# Patient Record
Sex: MALE | Race: WHITE | HISPANIC OR LATINO | Employment: FULL TIME | URBAN - METROPOLITAN AREA
[De-identification: names, ages, dates, MRNs, and addresses within clinical notes are randomized per-mention and may not be internally consistent; named-entity substitution may affect disease eponyms.]

---

## 2018-04-02 ENCOUNTER — OFFICE VISIT (OUTPATIENT)
Dept: BARIATRICS | Facility: CLINIC | Age: 43
End: 2018-04-02

## 2018-04-02 VITALS
HEIGHT: 73 IN | SYSTOLIC BLOOD PRESSURE: 130 MMHG | HEART RATE: 67 BPM | DIASTOLIC BLOOD PRESSURE: 80 MMHG | TEMPERATURE: 98.6 F | WEIGHT: 315 LBS | BODY MASS INDEX: 41.75 KG/M2

## 2018-04-02 DIAGNOSIS — E66.9 OBESITY, CLASS I, BMI 30-34.9: Primary | ICD-10-CM

## 2018-04-02 DIAGNOSIS — E66.01 MORBID (SEVERE) OBESITY DUE TO EXCESS CALORIES (HCC): Primary | ICD-10-CM

## 2018-04-02 PROCEDURE — RECHECK

## 2018-04-02 NOTE — PROGRESS NOTES
Bariatric Nutrition Assessment Note    Type of surgery    Preop  Surgery Date: TBD  Surgeon : Dr Jacob Aviles  43 y o   male     [place] with BMI of 25: 188 6 lb   Pre-Op Excess Wt: 184 9 lb  Blood pressure 130/80, pulse 67, temperature 98 6 °F (37 °C), height 6' 1" (1 854 m), weight (!) 169 kg (373 lb 8 oz)  Body mass index is 49 28 kg/m²  [facility] Equation:  BMR: 2648 kcal per day  2178 kcal for weight loss of 2 # per week  175 grams carb per day   125 grams protein per day       Weight History   Onset of Obesity: Childhood  Family history of obesity: Yes- sister had issues with weight, taking medication for weight loss, followed by doctor,  Wt Loss Attempts: High Protein/Low CHO diets (Atkins, Union, etc )  phenteramine for weight loss, LA weight loss  Maximum Wt Lost: 15 pounds lost , kept off several months  Review of History and Medications   History reviewed  No pertinent past medical history  History reviewed  No pertinent surgical history  Social History     Social History    Marital status: /Civil Union     Spouse name: N/A    Number of children: N/A    Years of education: N/A     Social History Main Topics    Smoking status: Never Smoker    Smokeless tobacco: Never Used    Alcohol use No    Drug use: No    Sexual activity: Not Asked     Other Topics Concern    None     Social History Narrative    None     No current outpatient prescriptions on file  Food Intake and Lifestyle Assessment   Food Intake Assessment completed via food log brought by patient  Breakfast: eggs with toast, normally around 7 am   Snack: 0   Lunch: 4 ribs with white rice, salad and fried plantains  Snack: 0  Dinner: burger with fries  Snack: crackers or mixed nuts , white cheddar popcorn  Beverage intake: water, juice, coffee/tea and rarely drinks soda      Protein supplement: none currently   Estimated protein intake per day: 80-90 grams   Estimated fluid intake per day: 64 ounces or more per day   Meals eaten away from home: 2 per week   Typical meal pattern: 3 meals per day and 0-1 snacks per day  Eating Behaviors: Consumption of high calorie/ high fat foods, Large portion sizes, Craves sweet foods and stress eating   Food allergies or intolerances: No Known Allergies  Cultural or Roman Catholic considerations: none noted     Physical Assessment  Physical Activity  Types of exercise: None, plans to buy a fit bit   Current physical limitations: none noted, short on breath     Psychosocial Assessment   Support systems: spouse sibling(s) friend(s)   Socioeconomic factors: none noted     Nutrition Diagnosis  Diagnosis: Overweight / Obesity (NC-3 3)  Related to: Physical inactivity and Excessive energy intake  As Evidenced by: BMI>35     Nutrition Prescription: Recommend the following diet  Regular and high protein     Interventions and Teaching   Discussed pre-op and post-op nutrition guidelines  Patient educated and handouts provided    Surgical changes to stomach / GI  Capacity of post-surgery stomach  Diet progression  Adequate hydration  Sugar and fat restriction to decrease "dumping syndrome"  Exercise  Suggestions for pre-op diet  Nutrition considerations after surgery  Protein supplements  Appropriate carbohydrate, protein, and fat intake, and food/fluid choices to maximize safe weight loss, nutrient intake, and tolerance   Dietary and lifestyle changes  Possible problems with poor eating habits  Intuitive eating  Techniques for self monitoring and keeping daily food journal  Vitamin / Mineral supplementation of Multivitamin with minerals, Vitamin D and patient is currently not taking any vitamins or  Minerals, instructed to start a multivitamin and mineral plus vitamin D3    Education provided to: patient    Barriers to learning: No barriers identified    Readiness to change: preparation    Prior research on procedure: discussed with provider, pre-op class and friends or family  had surgery and he has spoken with him concerning his experience with the gastric sleeve  Comprehension: demonstrated understanding     Expected Compliance: good  Recommendations  Pt is an appropriate candidate for surgery   Yes  Evaluation / Monitoring  Dietitian to Monitor: Eating pattern as discussed Tolerance of nutrition prescription Body weight Lab values Physical activity    Goals  Food journal, Complete lession plans 1-6, Eat 3 meals per day, Eliminate mindless snacking and purchase fit bit and track stesp- 6000 to 10 000 steps per day     Time Spent:   1 Hour

## 2018-04-02 NOTE — PROGRESS NOTES
Bariatric Behavioral Health Evaluation    Presenting Problem:  Soniya Police, :  1975    Patient here to increase health, increase mobility, prevent family diseases    Is the patient seeking Bariatric Surgery Eval? Yes  If yes how long have you researched this surgery option  Patient has been researching bariatric surgery for approximately 4 years    Realizes Post- Op Requirements? Yes   Patient/s spouse is seeking bariatric surgery at the same time at this place    Pre-morbid level of function and history of present illness: Patient would like to prevent the increase in health conditions    Psychiatric/Psychological Treatment Diagnosis:     Outpatient Counselor No     Psychiatrist No     Have you had Inpatient Treatment?  No    Domestic Violence No     Patient denies history of childhood trauma    Additional comments/stressors related to family/relationships/peer support:  Health, weight, current new business    Physical/Psychological Assessment:     Appearance: appropriate  Sociability: average  Affect: appropriate  Mood: calm  Thought Process: coherent  Speech: normal  Content: no impairment  Orientation: person  Yes , place  Yes , time  Yes , normal attention span  Yes , normal memory  Yes   and normal judgement  Yes   Insight: emotional  good    Risk Assessment:     none    Recommendations: Recommended for surgery  yes    Risk of Harm to Self or Others:     Observation:     Interviews this interview only    Access to weapons no     Based on the previous information, the client presents the following risk of harm to self or others: low      BARIATRIC SURGERY EDUCATION CHECKLIST    I have received education related to my bariatric surgery process and understand:    Patients may be required to complete a psychiatric evaluation and receive clearance for surgery from their psychiatrist     Patients who undergo weight loss surgery are at higher risk of increased mental health concerns and suicide attempts  Patients may be required to complete a full substance abuse evaluation and then complete all treatment recommendations prior to surgery  If diagnosis of abuse/dependence results, patient may be required to remain sober for one (1) year before having bariatric surgery  Patients on psychiatric medications should check with their provider to discuss psychiatric medications and the changes in absorption  Patient should discuss all time release medications with provider and take all medications as prescribed  The recommendation is that there is no use of  any tobacco products, Hookah or  vapes for the bariatric post-operation patient  Bariatric surgery patients should not consume alcohol as a post-operative patient as it may increase risk of numerous health conditions including but not limited to alcohol abuse and ulcers  There is a possibility of weight regain if patient does not follow all program guidelines and recommendations  Bariatric surgery patients should exercise thirty (30) to sixty (60) minutes per day to maintain post-surgical weight loss  Research indicates that bariatric patients are more successful when they see a therapist for up to two (2) years post-op  Patients will follow all medical and dietary recommendations provided  Patient will keep all scheduled appointments and follow up with their physician for a minimum of five (5) years  Patient will take all vitamins as recommended  Post-operative vitamins are life-long  Patient reviewed Bariatric Surgery Education Checklist and agrees they have received education on these issues   Note  Patient denies Axis I diagnosis or treatment  Patient educated regarding the impact of nicotine and alcohol on the post bariatric surgery patient  Patient meets criteria for surgery at this program and is referred to physician

## 2018-04-05 ENCOUNTER — OFFICE VISIT (OUTPATIENT)
Dept: BARIATRICS | Facility: CLINIC | Age: 43
End: 2018-04-05
Payer: COMMERCIAL

## 2018-04-05 VITALS
HEIGHT: 73 IN | WEIGHT: 315 LBS | BODY MASS INDEX: 41.75 KG/M2 | SYSTOLIC BLOOD PRESSURE: 130 MMHG | TEMPERATURE: 98.6 F | DIASTOLIC BLOOD PRESSURE: 90 MMHG | HEART RATE: 69 BPM

## 2018-04-05 DIAGNOSIS — E66.01 MORBID (SEVERE) OBESITY DUE TO EXCESS CALORIES (HCC): Primary | ICD-10-CM

## 2018-04-05 PROCEDURE — 99204 OFFICE O/P NEW MOD 45 MIN: CPT | Performed by: SURGERY

## 2018-04-05 NOTE — PROGRESS NOTES
BARIATRIC INITIAL CONSULT - BARIATRIC SURGERY    Alyssa Soto 43 y o  male MRN: [id number]  Unit/Bed#:  Encounter: [id number]      HPI:  Alyssa Soto is a 43 y o  male who presents with a longstanding history of morbid obesity and inability to sustain a meaningful weight loss  Here today to discuss bariatric options  Visit type: initial visit    Symptoms: inability to loss weight    Associated Symptoms: anxiety    Associated Conditions: abdominal obesity  Disease Complications: none  Weight Loss Interest: high  Previous Diet Trials: low calorie     Exercise Frequency:sedentary  Types of Exercise: walking    Review of Systems   All other systems reviewed and are negative  Historical Information   History reviewed  No pertinent past medical history  History reviewed  No pertinent surgical history  Social History   History   Alcohol Use No     History   Drug Use No     History   Smoking Status    Never Smoker   Smokeless Tobacco    Never Used     Family History: non-contributory    Meds/Allergies   all medications and allergies reviewed  No Known Allergies    Objective     Current Vitals:   Blood Pressure: 130/90 (04/05/18 1531)  Pulse: 69 (04/05/18 1531)  Temperature: 98 6 °F (37 °C) (04/05/18 1531)  Height: 6' 1" (185 4 cm) (04/05/18 1531)  Weight - Scale: (!) 168 kg (371 lb) (04/05/18 1531)    [unfilled]    Invasive Devices          No matching active lines, drains, or airways          Physical Exam   Constitutional: He is oriented to person, place, and time  He appears well-developed and well-nourished  No distress  Neurological: He is alert and oriented to person, place, and time  Psychiatric: He has a normal mood and affect  His behavior is normal  Judgment and thought content normal        Lab Results: I have personally reviewed pertinent lab results  Imaging: I have personally reviewed pertinent reports  EKG, Pathology, and Other Studies: I have personally reviewed pertinent reports  Assessment/PLAN:    43 y o  yo male with a long standing h/o of obesity and inability to sustain any meaningful weight loss on his own despite several attempts  He is interested in the Laparoscopic gastric Bypass or sleeve gastrectomy  As a part of his pre op evaluation, he will be referred to a cardiologist and for a sleep evaluation and consult  He needs an EGD to evaluate the anatomy of his GI tract  I have spent over 45 minutes with him face to face in the office today discussing his options and details of the surgery  We have seen an animation of the surgery on the computer that illustrates how the operation is done and how the anatomy will be altered with the procedure  Over 50% of this was coordinating care  I have discussed and educated the patient with regards to the components of our multidisciplinary program and the importance of compliance and follow up in the post operative period  He was given the opportunity to ask questions and I have answered all of them  The patient was also instructed with regards to the importance of behavior modification, nutritional counseling, support meeting attendance and lifestyle changes that are important to ensure success  Although there is a great statistical chance of improvement or even resolution of most of his associated comorbidities, the results vary from patient to patient and they largely depend on his commitment and compliance  He needs to lose 18 lbs prior to the operation        Zora Horta MD  4/5/2018  3:38 PM

## 2018-04-05 NOTE — LETTER
April 5, 2018     Karissa Deal MD  [address] [address] [place]     Patient: Celena Favre   YOB: 1975   Date of Visit: 4/5/2018       Dear Dr Gabi Hernandez: Thank you for referring Celena Favre to me for evaluation  Below are my notes for this consultation  If you have questions, please do not hesitate to call me  I look forward to following your patient along with you  Sincerely,        Doris Galo MD        CC: No Recipients  Doris Galo MD  4/5/2018  4:09 PM  Sign at close encounter      BARIATRIC INITIAL CONSULT - BARIATRIC SURGERY    Celena Favre 43 y o  male MRN: [id number]  Unit/Bed#:  Encounter: [id number]      HPI:  Celena Favre is a 43 y o  male who presents with a longstanding history of morbid obesity and inability to sustain a meaningful weight loss  Here today to discuss bariatric options  Visit type: initial visit    Symptoms: inability to loss weight    Associated Symptoms: anxiety    Associated Conditions: abdominal obesity  Disease Complications: none  Weight Loss Interest: high  Previous Diet Trials: low calorie     Exercise Frequency:sedentary  Types of Exercise: walking    Review of Systems   All other systems reviewed and are negative  Historical Information   History reviewed  No pertinent past medical history  History reviewed  No pertinent surgical history    Social History   History   Alcohol Use No     History   Drug Use No     History   Smoking Status    Never Smoker   Smokeless Tobacco    Never Used     Family History: non-contributory    Meds/Allergies   all medications and allergies reviewed  No Known Allergies    Objective     Current Vitals:   Blood Pressure: 130/90 (04/05/18 1531)  Pulse: 69 (04/05/18 1531)  Temperature: 98 6 °F (37 °C) (04/05/18 1531)  Height: 6' 1" (185 4 cm) (04/05/18 1531)  Weight - Scale: (!) 168 kg (371 lb) (04/05/18 1531)    [unfilled]    Invasive Devices          No matching active lines, drains, or airways Physical Exam   Constitutional: He is oriented to person, place, and time  He appears well-developed and well-nourished  No distress  Neurological: He is alert and oriented to person, place, and time  Psychiatric: He has a normal mood and affect  His behavior is normal  Judgment and thought content normal        Lab Results: I have personally reviewed pertinent lab results  Imaging: I have personally reviewed pertinent reports  EKG, Pathology, and Other Studies: I have personally reviewed pertinent reports  Assessment/PLAN:    43 y o  yo male with a long standing h/o of obesity and inability to sustain any meaningful weight loss on his own despite several attempts  He is interested in the Laparoscopic gastric Bypass or sleeve gastrectomy  As a part of his pre op evaluation, he will be referred to a cardiologist and for a sleep evaluation and consult  He needs an EGD to evaluate the anatomy of his GI tract  I have spent over 45 minutes with him face to face in the office today discussing his options and details of the surgery  We have seen an animation of the surgery on the computer that illustrates how the operation is done and how the anatomy will be altered with the procedure  Over 50% of this was coordinating care  I have discussed and educated the patient with regards to the components of our multidisciplinary program and the importance of compliance and follow up in the post operative period  He was given the opportunity to ask questions and I have answered all of them  The patient was also instructed with regards to the importance of behavior modification, nutritional counseling, support meeting attendance and lifestyle changes that are important to ensure success      Although there is a great statistical chance of improvement or even resolution of most of his associated comorbidities, the results vary from patient to patient and they largely depend on his commitment and compliance  He needs to lose 18 lbs prior to the operation        Amy Dorado MD  4/5/2018  3:38 PM

## 2018-04-24 PROBLEM — E66.01 MORBID OBESITY (HCC): Status: ACTIVE | Noted: 2018-04-24

## 2018-05-07 PROBLEM — Z91.89 AT RISK FOR OBSTRUCTIVE SLEEP APNEA: Status: ACTIVE | Noted: 2018-05-07

## 2018-05-15 ENCOUNTER — ANESTHESIA EVENT (OUTPATIENT)
Dept: GASTROENTEROLOGY | Facility: HOSPITAL | Age: 43
End: 2018-05-15
Payer: COMMERCIAL

## 2018-05-16 ENCOUNTER — HOSPITAL ENCOUNTER (OUTPATIENT)
Facility: HOSPITAL | Age: 43
Setting detail: OUTPATIENT SURGERY
Discharge: HOME | End: 2018-05-16
Attending: SURGERY | Admitting: SURGERY
Payer: COMMERCIAL

## 2018-05-16 ENCOUNTER — ANESTHESIA (OUTPATIENT)
Dept: GASTROENTEROLOGY | Facility: HOSPITAL | Age: 43
End: 2018-05-16
Payer: COMMERCIAL

## 2018-05-16 VITALS
DIASTOLIC BLOOD PRESSURE: 72 MMHG | TEMPERATURE: 96.7 F | SYSTOLIC BLOOD PRESSURE: 128 MMHG | RESPIRATION RATE: 18 BRPM | BODY MASS INDEX: 41.75 KG/M2 | WEIGHT: 315 LBS | HEART RATE: 72 BPM | HEIGHT: 73 IN | OXYGEN SATURATION: 95 %

## 2018-05-16 DIAGNOSIS — E66.01 MORBID OBESITY (HCC): ICD-10-CM

## 2018-05-16 PROCEDURE — 88305 TISSUE EXAM BY PATHOLOGIST: CPT | Performed by: PATHOLOGY

## 2018-05-16 PROCEDURE — 88342 IMHCHEM/IMCYTCHM 1ST ANTB: CPT | Performed by: PATHOLOGY

## 2018-05-16 PROCEDURE — 43239 EGD BIOPSY SINGLE/MULTIPLE: CPT | Performed by: SURGERY

## 2018-05-16 RX ORDER — PROPOFOL 10 MG/ML
INJECTION, EMULSION INTRAVENOUS AS NEEDED
Status: DISCONTINUED | OUTPATIENT
Start: 2018-05-16 | End: 2018-05-16 | Stop reason: SURG

## 2018-05-16 RX ORDER — SODIUM CHLORIDE 9 MG/ML
125 INJECTION, SOLUTION INTRAVENOUS CONTINUOUS
Status: DISCONTINUED | OUTPATIENT
Start: 2018-05-16 | End: 2018-05-16 | Stop reason: HOSPADM

## 2018-05-16 RX ADMIN — PROPOFOL 200 MG: 10 INJECTION, EMULSION INTRAVENOUS at 10:46

## 2018-05-16 RX ADMIN — PROPOFOL 30 MG: 10 INJECTION, EMULSION INTRAVENOUS at 10:48

## 2018-05-16 RX ADMIN — LIDOCAINE HYDROCHLORIDE 100 MG: 20 INJECTION, SOLUTION INTRAVENOUS at 10:46

## 2018-05-16 RX ADMIN — PROPOFOL 30 MG: 10 INJECTION, EMULSION INTRAVENOUS at 10:47

## 2018-05-16 RX ADMIN — PROPOFOL 20 MG: 10 INJECTION, EMULSION INTRAVENOUS at 10:49

## 2018-05-16 RX ADMIN — SODIUM CHLORIDE 125 ML/HR: 0.9 INJECTION, SOLUTION INTRAVENOUS at 09:05

## 2018-05-16 NOTE — H&P
This is a 43 y o  male with a history of morbid obesity and Body mass index is 48 95 kg/m²  Here for an EGD to evaluate the anatomy of the GI tract  Physical Exam    AAOx3  RRR  CTA B  Abdomen obese  Benign  A/P:    This is a 43 y o  male with a history of morbid obesity and Body mass index is 48 95 kg/m²       Will proceed with the EGD and biopsies        Deepali Lee MD  05/16/18  10:38 AM

## 2018-05-16 NOTE — ANESTHESIA PREPROCEDURE EVALUATION
Review of Systems/Medical History  Patient summary reviewed        Cardiovascular   Pulmonary  Negative pulmonary ROS        GI/Hepatic  Negative GI/hepatic ROS          Negative  ROS        Endo/Other    Obesity  morbid obesity   GYN  Negative gynecology ROS          Hematology  Negative hematology ROS      Musculoskeletal  Negative musculoskeletal ROS        Neurology  Negative neurology ROS      Psychology   Anxiety,              Physical Exam    Airway    Mallampati score: III  TM Distance: >3 FB  Neck ROM: full     Dental   No notable dental hx     Cardiovascular  Rhythm: regular, Rate: normal, Cardiovascular exam normal    Pulmonary  Breath sounds clear to auscultation,     Other Findings        Anesthesia Plan  ASA Score- 3     Anesthesia Type- IV sedation with anesthesia with ASA Monitors  Additional Monitors:   Airway Plan:         Plan Factors-    Induction-     Postoperative Plan-     Informed Consent- Anesthetic plan and risks discussed with patient

## 2018-06-26 ENCOUNTER — OFFICE VISIT (OUTPATIENT)
Dept: SLEEP CENTER | Facility: CLINIC | Age: 43
End: 2018-06-26
Payer: COMMERCIAL

## 2018-06-26 VITALS
HEIGHT: 73 IN | WEIGHT: 315 LBS | DIASTOLIC BLOOD PRESSURE: 78 MMHG | BODY MASS INDEX: 41.75 KG/M2 | SYSTOLIC BLOOD PRESSURE: 124 MMHG | HEART RATE: 80 BPM

## 2018-06-26 DIAGNOSIS — F41.9 ANXIETY: ICD-10-CM

## 2018-06-26 DIAGNOSIS — Z91.89 AT RISK FOR OBSTRUCTIVE SLEEP APNEA: Primary | ICD-10-CM

## 2018-06-26 DIAGNOSIS — E66.01 MORBID (SEVERE) OBESITY DUE TO EXCESS CALORIES (HCC): ICD-10-CM

## 2018-06-26 DIAGNOSIS — R03.0 PREHYPERTENSION: ICD-10-CM

## 2018-06-26 PROCEDURE — 99204 OFFICE O/P NEW MOD 45 MIN: CPT | Performed by: NURSE PRACTITIONER

## 2018-06-26 NOTE — PROGRESS NOTES
Consultation - [address] [facility], 1975, MRN: [id number]    6/26/2018        Reason for Consult / Principal Problem:    Evaluation for possible sleep apnea     Subjective:     HPI: Minor Gunter is a 43y o  year old male  Patient presents for evaluation of possible sleep apnea  He complains of daytime sleepiness with dozing when sedentary in the afternoon  He also reports feeling drowsy when driving at night  He awakens with morning headaches, that resolve throughout the morning without treatment  He also notes some difficulty with concentration  These symptoms have worsened over the past few years as he has gained weight  He is currently working with Dr Javier Olmstead and the bariatrics team and plans to have gastric bypass surgery in the near future  His comorbid conditions include morbid obesity, prehypertension and some mild anxiety with increased stress, for which he does not currently take medication  Review of Systems      Genitourinary none   Cardiology none   Gastrointestinal none   Neurology None, some awakening with headaches, decreased concentration   Constitutional none   Integumentary none   Psychiatry none   Musculoskeletal none   Pulmonary snoring   ENT none   Endocrine none   Hematological none     Employment:  He is currently working part-time as a  and  between the hours of 10:00 a m  to 2:00 p m  and 5:00 p m  to 9:00 p m , 5-6 days per week    Sleep Schedule:       Bedtime:  1:00 a m  to work days and 11:00 p m  on weekends      Latency:  Approximately 15 min      Wakeup time:  6:30am -7:00 a m  Awakenings:       Frequency:  Occasional nighttime awakening      Causes:  Feeling short of breath with some gasping      Duration:  He is able to return to sleep easily within minutes    Daytime Sleepiness / Inappropriate Sleep:       Most severe: Between 2:00 p m  to 3:00 p m         Naps :  Occasional naps 1 time per week      Time: Afternoons      Duration:  2 - 2 1/2 hours       Inappropriate drowsiness / sleep:  He does become drowsy at times in the afternoons and sometimes with nighttime driving    Snoring:  Snores loudly, gasping    Apnea:  His wife has witnessed periods of apnea    Change in Weight:  He has had a slight weight gain of approximately 5 lb over the past 6 months, currently in the bariatrics program with the intention of having gastric bypass surgery    Restless Leg Syndrome:  He denies any clinical symptoms consistent with this diagnosis     Other Complaints:  Denies any sleep walking, sleep talking, bruxism, sleep paralysis or hallucinations surrounding sleep     Social History:      Caffeine:  Approximately 16 oz of regular coffee per day and 12 oz of caffeinated soda      Tobacco:   reports that he has never smoked  He has never used smokeless tobacco        Alcohol:   reports that he drinks alcohol  One glass of wine once per week     Drugs:   reports that he does not use drugs  The review of systems and following portions of the patient's history were reviewed and updated as appropriate: allergies, current medications, past family history, past medical history, past social history, past surgical history and problem list       Objective:       Vitals:    06/26/18 1300   BP: 124/78   Pulse: 80   Weight: (!) 172 kg (378 lb 12 8 oz)   Height: 6' 1" (1 854 m)     Body mass index is 49 98 kg/m²  Neck Circumference: 42 5 (cm)  [place] Sleepiness Scale:   9  No current outpatient prescriptions on file      Physical Exam  General Appearance:   Alert, cooperative, no distress, appears stated age, morbidly obese     Head:   Normocephalic, without obvious abnormality, atraumatic     Eyes:   PERRL, conjunctiva/corneas clear, EOM's intact          Nose:  Nares normal, septum midline, mucosa normal, no drainage or sinus tenderness           Throat:  Lips, teeth and gums normal; tongue normal size and  shape and midline in position; mucosa moist and redundant bilaterally, uvula thick and partially visible, dipping below the base of the tongue, tonsils not visualized, Mallampati class 4       Neck:  Supple, symmetrical, trachea midline, no adenopathy; Thyroid: No enlargement, tenderness or nodules; no carotid bruit or JVD     Lungs:      Clear to auscultation bilaterally, respirations unlabored     Heart:   Regular rate and rhythm, S1 and S2 normal, no murmur, rub or gallop       Extremities:  Extremities normal, atraumatic, no cyanosis or edema     Pulses:  2+ and symmetric all extremities     Skin:  Skin color, texture, turgor normal, no rashes or lesions       Neurologic:  CNII-XII intact  Normal strength, sensation throughout     Sleep Study Results:  No prior sleep studies      ASSESSMENT / PLAN     1  At risk for obstructive sleep apnea     2  Morbid (severe) obesity due to excess calories [facility])  Ambulatory referral to Sleep Medicine   3  Prehypertension     4  Anxiety           Counseling / Coordination of Care  Total clinic time spent today 45 minutes  Greater than 50% of total time was spent with the patient and / or family counseling and / or coordination of care  A description of the counseling / coordination of care:     instructions for management, risk factor reductions, prognosis, patient and family education, impressions and risks and benefits of treatment options    Today we discussed the anatomy and physiology of the upper airway  I pointed out how changes in this region can result in both snoring and abnormal breathing events including apneas and hypopneas  I explained the most common co-morbidities of untreated sleep apnea  After this we talked about some forms of treatment including application of positive airway pressure, mandibular advancement devices and surgery       In order to evaluate the possibility of Obstructive Sleep Apnea as a cause of the patient's symptoms, a test will be completed to identify the presence or absence of abnormal nocturnal breathing  This will consist of either a diagnostic polysomnogram or a home sleep test   If significant abnormal nocturnal breathing is detected, nasal CPAP will be titrated to find the optimum pressure needed to maintain upper airway patency during sleep  Following testing, the patient will return to the [address] for a review of the test results and to discuss possible treatment options  We discussed that weight loss can improve symptoms of sleep apnea  He was encouraged to continue to work with the bariatrics team to help with this  The following instructions have been given to the patient today:    Patient Instructions   1  Schedule diagnostic sleep study with CPAP titration study to follow, if needed  2  Schedule follow up visit to review study results  3  Schedule DME appointment for CPAP equipment, if needed  4   Schedule follow up visit for CPAP compliance and recheck            Artemio Santos, [address] [facility]

## 2018-06-26 NOTE — PATIENT INSTRUCTIONS
1  Schedule diagnostic sleep study with CPAP titration study to follow, if needed  2  Schedule follow up visit to review study results  3  Schedule DME appointment for CPAP equipment, if needed  4   Schedule follow up visit for CPAP compliance and recheck

## 2018-07-01 ENCOUNTER — HOSPITAL ENCOUNTER (OUTPATIENT)
Dept: SLEEP CENTER | Facility: CLINIC | Age: 43
Discharge: HOME | End: 2018-07-01
Payer: COMMERCIAL

## 2018-07-01 DIAGNOSIS — Z91.89 AT RISK FOR OBSTRUCTIVE SLEEP APNEA: ICD-10-CM

## 2018-07-01 PROCEDURE — 95810 POLYSOM 6/> YRS 4/> PARAM: CPT | Performed by: PSYCHIATRY & NEUROLOGY

## 2018-07-01 PROCEDURE — 95810 POLYSOM 6/> YRS 4/> PARAM: CPT

## 2018-07-02 ENCOUNTER — TELEPHONE (OUTPATIENT)
Dept: SLEEP CENTER | Facility: CLINIC | Age: 43
End: 2018-07-02

## 2018-07-02 NOTE — PROGRESS NOTES
Sleep Study Documentation    Pre-Sleep Study       Sleep testing procedure explained to patient:YES    Patient napped prior to study:NO    Caffeine:Dayshift worker after 12PM   Caffeine use:YES- coffee  6 ounces    Alcohol:Dayshift workers after 5PM: Alcohol use:NO    Typical day for patient:YES       Study Documentation  Diagnostic   Snore: Moderate  Supplemental O2: no    O2 flow rate (L/min) range 0  O2 flow rate (L/min) final 0  Minimum SaO2 68  Baseline SaO2 94            EKG abnormalities: No    EEG abnormalities: yes:  EPOCH example and comments:  ECG artifact throughout study  Study Terminated:no    Patient classification: employed       Post-Sleep Study    Medication used at bedtime or during sleep study:NO    Patient reports time it took to fall asleep:less than 20 minutes    Patient reports waking up during study:1 to 2 times  Patient reports returning to sleep without difficulty  Patient reports sleeping 6 to 8 hours without dreaming  Patient reports sleep during study:better than usual    Patient rated sleepiness: Not sleepy or tired    PAP treatment:no

## 2018-07-02 NOTE — TELEPHONE ENCOUNTER
LOTTIE Eisenberg asked me to call the patient and move up his CPAP study due to results of Diagnostic Study  I called pt, told him that his diagnostic study did show Sleep Apnea and moved his appoint,ent to 7/12  Moved up his follow up also

## 2018-07-12 ENCOUNTER — HOSPITAL ENCOUNTER (OUTPATIENT)
Dept: SLEEP CENTER | Facility: CLINIC | Age: 43
Discharge: HOME | End: 2018-07-12
Payer: COMMERCIAL

## 2018-07-12 DIAGNOSIS — Z91.89 AT RISK FOR OBSTRUCTIVE SLEEP APNEA: ICD-10-CM

## 2018-07-12 PROCEDURE — 95811 POLYSOM 6/>YRS CPAP 4/> PARM: CPT | Performed by: PSYCHIATRY & NEUROLOGY

## 2018-07-12 PROCEDURE — 95811 POLYSOM 6/>YRS CPAP 4/> PARM: CPT

## 2018-07-13 DIAGNOSIS — G47.33 OSA (OBSTRUCTIVE SLEEP APNEA): Primary | ICD-10-CM

## 2018-07-13 NOTE — PROGRESS NOTES
Sleep Study Documentation    Pre-Sleep Study       Sleep testing procedure explained to patient:YES    Patient napped prior to study:NO    Caffeine:Dayshift worker after 12PM   Caffeine use:NO    Alcohol:Dayshift workers after 5PM: Alcohol use:YES-Wine 1 serving    Typical day for patient:NO       Study Documentation  Treatment   Optimal PAP pressure: 8  Leak:Small  Snore:Eliminated  REM Obtained:yes  Supplemental O2: no    Minimum SaO2 88  Baseline SaO2 94  PAP mask tried (list all)  Tiki Briggs Large nasal mask  PAP mask choice (final)  same  PAP mask type:nasal  PAP pressure at which snoring was eliminated 8  Minimum SaO2 at final PAP pressure 92  Mode of Therapy:CPAP  ETCO2:No  CPAP changed to BiPAP:No    Mode of Therapy:CPAP    EKG abnormalities: no     EEG abnormalities: no    Study Terminated:no           Post-Sleep Study    Medication used at bedtime or during sleep study:NO    Patient reports time it took to fall asleep:less than 20 minutes    Patient reports waking up during study:1 to 2 times  Patient reports returning to sleep without difficulty  Patient reports sleeping 6 to 8 hours without dreaming  Patient reports sleep during study:better than usual    Patient rated sleepiness: Not sleepy or tired    PAP treatment:yes: Post PAP treatment patient reports feeling better and  would wear PAP mask at home

## 2018-07-17 ENCOUNTER — TELEPHONE (OUTPATIENT)
Dept: SLEEP CENTER | Facility: CLINIC | Age: 43
End: 2018-07-17

## 2018-07-17 NOTE — TELEPHONE ENCOUNTER
Spoke with patient  Instructed to bring mask to follow up appointment on 7/20    Rx and data to [facility]

## 2018-07-20 ENCOUNTER — OFFICE VISIT (OUTPATIENT)
Dept: SLEEP CENTER | Facility: CLINIC | Age: 43
End: 2018-07-20

## 2018-07-20 VITALS
WEIGHT: 315 LBS | HEART RATE: 72 BPM | SYSTOLIC BLOOD PRESSURE: 130 MMHG | DIASTOLIC BLOOD PRESSURE: 90 MMHG | BODY MASS INDEX: 41.75 KG/M2 | HEIGHT: 73 IN

## 2018-07-20 DIAGNOSIS — Z99.89 OBSTRUCTIVE SLEEP APNEA ON CPAP: Primary | ICD-10-CM

## 2018-07-20 DIAGNOSIS — G47.33 OBSTRUCTIVE SLEEP APNEA ON CPAP: Primary | ICD-10-CM

## 2018-07-20 DIAGNOSIS — E66.01 MORBID OBESITY (HCC): ICD-10-CM

## 2018-07-20 PROCEDURE — 99213 OFFICE O/P EST LOW 20 MIN: CPT | Performed by: NURSE PRACTITIONER

## 2018-07-20 NOTE — PATIENT INSTRUCTIONS
1    your CPAP equipment today  2  Begin using your CPAP equipment every night  3  Begin cleaning your CPAP daily after use and once weekly with 1 part white vinegar and 10 parts water  4  Contact your medical equipment distributor regarding any questions concerning your CPAP equipment  5  Contact the [address] with any other questions, prior to next visit, if needed  6    Schedule compliance follow-up visit in 2-3 months

## 2018-07-20 NOTE — PROGRESS NOTES
Progress Note - [facility] 37 y o  male   :1975, MRN: [id number]  2018          Follow Up Evaluation / Problem:     Obstructive Sleep Apnea  Morbid Obesity    HPI: Minor Gunter is a 37y o  year old male  He presents today for follow-up of obstructive sleep apnea  He complained of loud snoring and difficulty sleeping on his back due to trouble breathing  He completed a diagnostic sleep study which indicated an AHI of 28 8 which increased to 80 7 during REM sleep  He completed a CPAP titration study on   He states that he felt much more rested upon awakening the morning of his CPAP study  He is here today to review the results of his studies and obtain CPAP equipment  Review of Systems      Genitourinary none   Cardiology none   Gastrointestinal none   Neurology none   Constitutional fatigue   Integumentary none   Psychiatry none   Musculoskeletal none   Pulmonary none   ENT none   Endocrine none   Hematological none       No current outpatient prescriptions on file  [place] Sleepiness Scale  Sitting and reading: Slight chance of dozing  Watching TV: Slight chance of dozing  Sitting, inactive in a public place (e g  a theatre or a meeting): Slight chance of dozing  As a passenger in a car for an hour without a break: Moderate chance of dozing  Lying down to rest in the afternoon when circumstances permit: High chance of dozing  Sitting and talking to someone: Would never doze  Sitting quietly after a lunch without alcohol: Slight chance of dozing  In a car, while stopped for a few minutes in traffic: Would never doze  Total score: 9              Vitals:    18 0900   BP: 130/90   Pulse: 72   Weight: (!) 172 kg (379 lb)   Height: 6' 1" (1 854 m)       Body mass index is 50 kg/m²      Neck Circumference: 42 5       EPWORTH SLEEPINESS SCORE  Total score: 9      Past History Since Last Sleep Center Visit:   He denies any significant changes to his health since his last visit  The results of his sleep studies are noted below  Diagnostic sleep study completed on July 1st:  Sleep latency was 18 minutes and 59 seconds  Sleep efficiency was 90 2%  REM latency was 1 hour 7 minutes  100% of the test was spent in the supine position  A total of 174 abnormal breathing events were identified  During  REM sleep this number increased to 80 7 events per hour lowest measured oxygen saturation was 68% no periodic  limb movements were seen  Total of 35 arousals were identified  Arousal index is 5 8 events per hour  Sleep  architecture was moderately fragmented  Significant oxygen desaturation was seen primarily during REM sleep in  association with significant events of apnea and hypopnea  These findings are consistent with a diagnosis of  moderate to severe obstructive sleep apnea  A repeat study for titration of nasal CPAP is recommended  Clinical  correlation is suggested  CPAP titration study completed on July 13th:     Sleep latency was 24 min and 58 sec  Sleep efficiency was 92 5%  REM latency was 1 hr 27 min and 30 sec  Five abnormal breathing events were identified  The AHI was 0 8 abnormal breathing events per hour  Lowest oxygen level measured was 88%  No periodic limb movements were seen  Thirteen arousals were identified  The arousal index is two abnormal events per hour  Nasal CPAP was titrated from 5 to 8 cm of water  Patient appeared to do best using 8 cm of pressure  CPAP was supplied using a [employer] nasal mask  Continued use of this equipment at the settings is recommended  The review of systems and following portions of the patient's history were reviewed and updated as appropriate: allergies, current medications, past family history, past medical history, past social history, past surgical history, and problem list     OBJECTIVE    PAP Pressure: Nasal CPAP set to deliver 8 cm of water pressure  DME Provider:  [facility]    Physical Exam:     General Appearance:   Alert, cooperative, no distress, appears stated age, morbidly obese     Head:   Normocephalic, without obvious abnormality, atraumatic     Eyes:   PERRL, conjunctiva/corneas clear, EOM's intact          Nose:  Nares normal, septum midline, no drainage or sinus tenderness           Throat:  Lips, teeth and gums normal; tongue normal size and  shape and midline mucosa  Moist and redundant bilaterally, uvula  Thick and partially visible, dipping below the base of the tongue, tonsils  Not visualized, Mallampati class 4       Neck:  Supple, symmetrical, trachea midline, no adenopathy; Thyroid: No enlargement, tenderness or nodules; no carotid bruit or JVD     Lungs:      Clear to auscultation bilaterally, respirations unlabored     Heart:   Regular rate and rhythm, S1 and S2 normal, no murmur, rub or gallop       Extremities:  Extremities normal, atraumatic, no cyanosis or edema     Pulses:  2+ and symmetric all extremities     Skin:  Skin color, texture, turgor normal, no rashes or lesions       Neurologic:  CNII-XII intact  Normal strength, sensation throughout       ASSESSMENT / PLAN    1  Obstructive sleep apnea on CPAP     2  Morbid obesity ([address] )           Plan:  Begin use of CPAP treatment    Counseling / Coordination of Care  Total clinic time spent today 15 minutes  Greater than 50% of total time was spent with the patient and / or family counseling and / or coordination of care  A description of the counseling / coordination of care:     Impressions, Diagnostic results, Prognosis, Instructions for management, Risks and benefits of treatment, Patient and family education, Risk factor reductions and Importance of compliance with treatment    I reviewed the recent test results with the patient  We talked about the abnormal findings, including those consistent with Obstructive Sleep Apnea   We then discussed how the these are categorized as mild, moderate or severe  We also covered the medical comorbidities associated with untreated Obstructive Sleep Apnea including, hypertension, cardiac arrythmia, myocardial infarction and stroke  I explained how the risk of these conditions increases with the severity of the test results  I also spoke in some detail about the most common treatment options including weight loss, nasal PAP, mandibular advancement devices and uvulopalatopharyngoplasty (UPPP)  I answered all questions posed to me and gave my opinion regarding the best options for treatment based on the patient and their level of disease  In this case he chose to begin treatment using PAP treatment  If CPAP is the chosen form of treatment the patient will return in 4 to 12 weeks for a review of compliance data collected since they began to use their equipment  We will discuss this data and talk about any problems that may prevent successful treatment of Obstructive Sleep Apnea  Changes will be made in treatment parameters or interface devices in order to improve his ability to continue using PAP to maintain upper airway patency during sleep  He is currently working with the bariatric team and planning to have gastric bypass surgery in August or September  We discussed that significant weight loss, such as 25-30 pounds will likely change the amount of pressure he needs to have delivered to maintain airway patency  He will contact [address] as he begins to lose weight so that pressure adjustment can be done, as needed  The following instructions have been given to the patient today:    Patient Instructions   1   your CPAP equipment today  2  Begin using your CPAP equipment every night  3  Begin cleaning your CPAP daily after use and once weekly with 1 part white vinegar and 10 parts water  4  Contact your medical equipment distributor regarding any questions concerning your CPAP equipment  5    Contact the [address] with any other questions, prior to next visit, if needed  6    Schedule compliance follow-up visit in 2-3 months        [place], [address] [facility]

## 2018-07-26 ENCOUNTER — OFFICE VISIT (OUTPATIENT)
Dept: FAMILY MEDICINE CLINIC | Facility: CLINIC | Age: 43
End: 2018-07-26
Payer: COMMERCIAL

## 2018-07-26 VITALS
SYSTOLIC BLOOD PRESSURE: 136 MMHG | HEART RATE: 72 BPM | DIASTOLIC BLOOD PRESSURE: 90 MMHG | TEMPERATURE: 98.3 F | HEIGHT: 73 IN | WEIGHT: 315 LBS | BODY MASS INDEX: 41.75 KG/M2 | RESPIRATION RATE: 18 BRPM

## 2018-07-26 DIAGNOSIS — Z00.00 WELL ADULT EXAM: Primary | ICD-10-CM

## 2018-07-26 DIAGNOSIS — E66.01 MORBID (SEVERE) OBESITY DUE TO EXCESS CALORIES (HCC): ICD-10-CM

## 2018-07-26 PROCEDURE — 99386 PREV VISIT NEW AGE 40-64: CPT | Performed by: FAMILY MEDICINE

## 2018-07-26 NOTE — PROGRESS NOTES
Assessment/Plan     Healthy male exam      1  Here for physical  2  Patient Counseling:  --Nutrition: Stressed importance of moderation in sodium/caffeine intake, saturated fat and cholesterol, caloric balance, sufficient intake of fresh fruits, vegetables, fiber  --  --Exercise: Stressed the importance of regular exercise-just signed up for karate 1-2 times/week, walking and yard work  --Substance Abuse: no concerns  --Sexuality:   --Injury prevention: Discussed safety belts, safety helmets, smoke detector,   --Dental health: Discussed importance of regular tooth brushing, flossing, and dental visits  --Immunizations reviewed  --After hours service discussed with patient    3  Discussed the patient's BMI with him  The BMI is above average; BMI management plan is completed  4  Follow up as needed for acute illness  Subjective     Dary Martinez is a 37 y o  male and is here for a comprehensive physical exam  The patient reports problems - obesity-considering weight loss surgery  Do you take any herbs or supplements that were not prescribed by a doctor? no  Are you taking calcium supplements? no  Are you taking aspirin daily? no     History:  Patient receives prostate care outside our clinic  Date last prostate exam: never  Date last PSA: never    The following portions of the patient's history were reviewed and updated as appropriate: allergies, current medications, past family history, past medical history, past social history, past surgical history and problem list   none    Review of Systems  Do you have pain that bothers you in your daily life? no  sleep apnea, weight, no other concerns      Objective     /90 (BP Location: Right leg, Patient Position: Sitting, Cuff Size: Standard)   Pulse 72   Temp 98 3 °F (36 8 °C) (Temporal)   Resp 18   Ht 6' 1" (1 854 m)   Wt (!) 173 kg (380 lb 6 oz)   BMI 50 18 kg/m²     General Appearance:    Alert, cooperative, no distress, appears stated age   Head: Normocephalic, without obvious abnormality, atraumatic   Eyes:    PERRL, conjunctiva/corneas clear, EOM's intact, fundi     benign, both eyes        Ears:    Normal TM's and external ear canals, both ears   Nose:   Nares normal, septum midline, mucosa normal, no drainage    or sinus tenderness   Throat:   Lips, mucosa, and tongue normal; teeth and gums normal   Neck:   Supple, symmetrical, trachea midline, no adenopathy;        thyroid:  No enlargement/tenderness/nodules; no carotid    bruit or JVD   Back:     Symmetric, no curvature, ROM normal, no CVA tenderness   Lungs:     Clear to auscultation bilaterally, respirations unlabored   Chest wall:    No tenderness or deformity   Heart:    Regular rate and rhythm, S1 and S2 normal, no murmur, rub   or gallop   Abdomen:     Soft, non-tender, bowel sounds active all four quadrants,     no masses, no organomegaly   Genitalia:       Rectal:       Extremities:   Extremities normal, atraumatic, no cyanosis or edema   Pulses:   2+ and symmetric all extremities   Skin:   Skin color, texture, turgor normal, no rashes or lesions   Lymph nodes:   Cervical, supraclavicular, and axillary nodes normal   Neurologic:   CNII-XII intact  Normal strength, sensation and reflexes       throughout

## 2018-08-14 ENCOUNTER — APPOINTMENT (OUTPATIENT)
Dept: LAB | Facility: CLINIC | Age: 43
End: 2018-08-14
Payer: COMMERCIAL

## 2018-08-14 DIAGNOSIS — E66.01 MORBID (SEVERE) OBESITY DUE TO EXCESS CALORIES (HCC): ICD-10-CM

## 2018-08-14 LAB
25(OH)D3 SERPL-MCNC: 20.7 NG/ML (ref 30–100)
ALBUMIN SERPL BCP-MCNC: 3.7 G/DL (ref 3.5–5)
ALP SERPL-CCNC: 86 U/L (ref 46–116)
ALT SERPL W P-5'-P-CCNC: 33 U/L (ref 12–78)
ANION GAP SERPL CALCULATED.3IONS-SCNC: 8 MMOL/L (ref 4–13)
AST SERPL W P-5'-P-CCNC: 17 U/L (ref 5–45)
BASOPHILS # BLD AUTO: 0.01 THOUSANDS/ΜL (ref 0–0.1)
BASOPHILS NFR BLD AUTO: 0 % (ref 0–1)
BILIRUB SERPL-MCNC: 0.88 MG/DL (ref 0.2–1)
BUN SERPL-MCNC: 16 MG/DL (ref 5–25)
CALCIUM SERPL-MCNC: 8.9 MG/DL (ref 8.3–10.1)
CHLORIDE SERPL-SCNC: 107 MMOL/L (ref 100–108)
CHOLEST SERPL-MCNC: 99 MG/DL (ref 50–200)
CO2 SERPL-SCNC: 26 MMOL/L (ref 21–32)
CREAT SERPL-MCNC: 1.37 MG/DL (ref 0.6–1.3)
EOSINOPHIL # BLD AUTO: 0.15 THOUSAND/ΜL (ref 0–0.61)
EOSINOPHIL NFR BLD AUTO: 3 % (ref 0–6)
ERYTHROCYTE [DISTWIDTH] IN BLOOD BY AUTOMATED COUNT: 13.9 % (ref 11.6–15.1)
GFR SERPL CREATININE-BSD FRML MDRD: 63 ML/MIN/1.73SQ M
GLUCOSE P FAST SERPL-MCNC: 106 MG/DL (ref 65–99)
HCT VFR BLD AUTO: 39.1 % (ref 36.5–49.3)
HDLC SERPL-MCNC: 27 MG/DL (ref 40–60)
HGB BLD-MCNC: 13.6 G/DL (ref 12–17)
IMM GRANULOCYTES # BLD AUTO: 0.01 THOUSAND/UL (ref 0–0.2)
IMM GRANULOCYTES NFR BLD AUTO: 0 % (ref 0–2)
LDLC SERPL CALC-MCNC: 52 MG/DL (ref 0–100)
LYMPHOCYTES # BLD AUTO: 1.75 THOUSANDS/ΜL (ref 0.6–4.47)
LYMPHOCYTES NFR BLD AUTO: 32 % (ref 14–44)
MCH RBC QN AUTO: 27.3 PG (ref 26.8–34.3)
MCHC RBC AUTO-ENTMCNC: 34.8 G/DL (ref 31.4–37.4)
MCV RBC AUTO: 78 FL (ref 82–98)
MONOCYTES # BLD AUTO: 0.6 THOUSAND/ΜL (ref 0.17–1.22)
MONOCYTES NFR BLD AUTO: 11 % (ref 4–12)
NEUTROPHILS # BLD AUTO: 2.9 THOUSANDS/ΜL (ref 1.85–7.62)
NEUTS SEG NFR BLD AUTO: 54 % (ref 43–75)
NONHDLC SERPL-MCNC: 72 MG/DL
NRBC BLD AUTO-RTO: 0 /100 WBCS
PLATELET # BLD AUTO: 245 THOUSANDS/UL (ref 149–390)
PMV BLD AUTO: 10.4 FL (ref 8.9–12.7)
POTASSIUM SERPL-SCNC: 4 MMOL/L (ref 3.5–5.3)
PROT SERPL-MCNC: 7.4 G/DL (ref 6.4–8.2)
PTH-INTACT SERPL-MCNC: 72.6 PG/ML (ref 18.4–80.1)
RBC # BLD AUTO: 4.99 MILLION/UL (ref 3.88–5.62)
SODIUM SERPL-SCNC: 141 MMOL/L (ref 136–145)
TRIGL SERPL-MCNC: 102 MG/DL
TSH SERPL DL<=0.05 MIU/L-ACNC: 2.03 UIU/ML (ref 0.36–3.74)
VIT B12 SERPL-MCNC: 305 PG/ML (ref 100–900)
WBC # BLD AUTO: 5.42 THOUSAND/UL (ref 4.31–10.16)

## 2018-08-14 PROCEDURE — 80053 COMPREHEN METABOLIC PANEL: CPT

## 2018-08-14 PROCEDURE — 82306 VITAMIN D 25 HYDROXY: CPT

## 2018-08-14 PROCEDURE — 82607 VITAMIN B-12: CPT

## 2018-08-14 PROCEDURE — 85025 COMPLETE CBC W/AUTO DIFF WBC: CPT

## 2018-08-14 PROCEDURE — 83970 ASSAY OF PARATHORMONE: CPT

## 2018-08-14 PROCEDURE — 80061 LIPID PANEL: CPT

## 2018-08-14 PROCEDURE — 36415 COLL VENOUS BLD VENIPUNCTURE: CPT

## 2018-08-14 PROCEDURE — 84425 ASSAY OF VITAMIN B-1: CPT

## 2018-08-14 PROCEDURE — 84443 ASSAY THYROID STIM HORMONE: CPT

## 2018-08-14 PROCEDURE — 84590 ASSAY OF VITAMIN A: CPT

## 2018-08-15 DIAGNOSIS — E66.01 OBESITY, CLASS III, BMI 40-49.9 (MORBID OBESITY) (HCC): Primary | ICD-10-CM

## 2018-08-18 LAB — VIT A SERPL-MCNC: 23.7 UG/DL (ref 33.1–100)

## 2018-08-19 LAB — VIT B1 BLD-SCNC: 87.5 NMOL/L (ref 66.5–200)

## 2018-09-06 ENCOUNTER — TELEPHONE (OUTPATIENT)
Dept: BARIATRICS | Facility: CLINIC | Age: 43
End: 2018-09-06

## 2018-09-18 ENCOUNTER — OFFICE VISIT (OUTPATIENT)
Dept: NEPHROLOGY | Facility: CLINIC | Age: 43
End: 2018-09-18
Payer: COMMERCIAL

## 2018-09-18 VITALS
HEIGHT: 73 IN | HEART RATE: 80 BPM | SYSTOLIC BLOOD PRESSURE: 130 MMHG | BODY MASS INDEX: 41.75 KG/M2 | DIASTOLIC BLOOD PRESSURE: 80 MMHG | WEIGHT: 315 LBS

## 2018-09-18 DIAGNOSIS — E66.01 OBESITY, CLASS III, BMI 40-49.9 (MORBID OBESITY) (HCC): ICD-10-CM

## 2018-09-18 DIAGNOSIS — R79.89 ELEVATED SERUM CREATININE: Primary | ICD-10-CM

## 2018-09-18 DIAGNOSIS — R80.1 PERSISTENT PROTEINURIA: ICD-10-CM

## 2018-09-18 DIAGNOSIS — R03.0 PREHYPERTENSION: ICD-10-CM

## 2018-09-18 LAB
SL AMB  POCT GLUCOSE, UA: NEGATIVE
SL AMB LEUKOCYTE ESTERASE,UA: NEGATIVE
SL AMB POCT BILIRUBIN,UA: NEGATIVE
SL AMB POCT BLOOD,UA: NEGATIVE
SL AMB POCT CLARITY,UA: CLEAR
SL AMB POCT COLOR,UA: YELLOW
SL AMB POCT KETONES,UA: NEGATIVE
SL AMB POCT NITRITE,UA: NEGATIVE
SL AMB POCT PH,UA: 6
SL AMB POCT SPECIFIC GRAVITY,UA: 1.01
SL AMB POCT URINE PROTEIN: POSITIVE
SL AMB POCT UROBILINOGEN: 0.2

## 2018-09-18 PROCEDURE — 81002 URINALYSIS NONAUTO W/O SCOPE: CPT | Performed by: INTERNAL MEDICINE

## 2018-09-18 PROCEDURE — 99244 OFF/OP CNSLTJ NEW/EST MOD 40: CPT | Performed by: INTERNAL MEDICINE

## 2018-09-18 NOTE — LETTER
September 18, 2018     MD Adelaide Burr [address] [facility]    Patient: Sera Santos   YOB: 1975   Date of Visit: 9/18/2018     Dear Dr Ricky Sanderson      Thank you for referring Sera Santos to me for evaluation  Below are the relevant portions of my assessment and plan of care  If you have questions, please do not hesitate to call me  I look forward to following Ta along with you  Sincerely,        Fredis Duncan,         CC: No Recipients    Progress Notes:    ASSESSMENT and PLAN:  1  Elevated serum creatinine of 1 37, estimated GFR 62, dipstick showing proteinuria, may represent early onset kidney disease, likely secondary to hyper filtration given obesity, +/- hypertension  2  Dipstick positive for proteinuria, check microalbumin to creatinine ratio  3  Obstructive sleep apnea, CPAP  4  Obesity, workup pending for possible bariatric surgery    · At this point time would like to repeat laboratory studies to see if there is any significant change in renal function  · Quantify proteinuria  · Check renal ultrasound for size and echogenicity  · If renal function is stable and he has minimal proteinuria, can proceed with bariatric surgery  We may consider antihypertensive agent if he has significant proteinuria, likely low-dose lisinopril or losartan

## 2018-09-18 NOTE — PROGRESS NOTES
NEPHROLOGY OUTPATIENT CONSULTATION   Ta Mckeon 37 y o  male MRN: [id number]    Reason for consultation:   Elevated serum creatinine    ASSESSMENT and PLAN:  1  Elevated serum creatinine of 1 37, estimated GFR 62, dipstick showing proteinuria, may represent early onset kidney disease, likely secondary to hyper filtration given obesity, +/- hypertension  2  Dipstick positive for proteinuria, check microalbumin to creatinine ratio  3  Obstructive sleep apnea, CPAP  4  Obesity, workup pending for possible bariatric surgery    · At this point time would like to repeat laboratory studies to see if there is any significant change in renal function  · Quantify proteinuria  · Check renal ultrasound for size and echogenicity  · If renal function is stable and he has minimal proteinuria, can proceed with bariatric surgery  We may consider antihypertensive agent if he has significant proteinuria, likely low-dose lisinopril or losartan  HISTORY OF PRESENT ILLNESS:  We had the pleasure of seeing Ta for nephrology consultation secondary to an elevated creatinine of 1 37  As we know he is a 80-year-old male with known history of sleep apnea, obesity as well as borderline hypertension  Currently he is being evaluated for possible bariatric surgery  Laboratory studies revealed an elevated creatinine of 1 37  Unfortunately has not seen is daily position for some time, unknown whether he has any recent laboratory studies to compare to  Currently he has been feeling relatively well  Denies any recent hospitalizations or illnesses  Denies any chest pain or shortness of Breath  Occasional lower extremity swelling which resolved by the morning  Appetite is stable  Denies any rash  Occasional headaches which she takes Motrin maybe once a month  No problems with urination including dysuria or hematuria  Review of Systems   Constitutional: Negative for appetite change and fatigue     Respiratory: Negative for shortness of breath  Cardiovascular: Positive for leg swelling  Negative for chest pain  Gastrointestinal: Negative for abdominal distention, abdominal pain, diarrhea, nausea and vomiting  Genitourinary: Negative for dysuria, flank pain, frequency and hematuria  Musculoskeletal: Negative for arthralgias, back pain and myalgias  Skin: Negative for rash  Neurological: Negative for dizziness, weakness and light-headedness  All other systems reviewed and are negative  PAST MEDICAL HISTORY:  Past Medical History:   Diagnosis Date    Anxiety     Morbid obesity ([address] )     Snoring        PAST SURGICAL HISTORY:  Past Surgical History:   Procedure Laterality Date    NO PAST SURGERIES      ID EGD TRANSORAL BIOPSY SINGLE/MULTIPLE N/A 5/16/2018    Procedure: ESOPHAGOGASTRODUODENOSCOPY (EGD) with bx;  Surgeon: Zulema Haywood MD;  Location: [facility]; Service: Bariatrics    WISDOM TOOTH EXTRACTION         ALLERGIES:  Allergies   Allergen Reactions    Other      Seasonal allergies       SOCIAL HISTORY:  History   Alcohol Use    Yes     Comment: rare social     History   Drug Use No     History   Smoking Status    Never Smoker   Smokeless Tobacco    Never Used       FAMILY HISTORY:  Family History   Problem Relation Age of Onset   [employer] Cancer Mother         leukemia    Dementia Father        MEDICATIONS:  No current outpatient prescriptions on file  PHYSICAL EXAM:  Vitals:    09/18/18 1538   BP: 130/80   BP Location: Right arm   Patient Position: Sitting   Cuff Size: Large   Pulse: 80   Weight: (!) 170 kg (375 lb)   Height: 6' 1" (1 854 m)       Physical Exam   Constitutional: He is oriented to person, place, and time  He appears well-developed  No distress  HENT:   Head: Normocephalic  Eyes: Conjunctivae are normal  No scleral icterus  Neck: Neck supple  Cardiovascular: Normal rate and regular rhythm  Pulmonary/Chest: Effort normal and breath sounds normal    Abdominal: Soft   There is no tenderness  Musculoskeletal: He exhibits edema (Trace to 1+ edema)  Neurological: He is alert and oriented to person, place, and time  Skin: Skin is warm and dry  No rash noted  Psychiatric: He has a normal mood and affect  Nursing note and vitals reviewed          Laboratory results:   Results for orders placed or performed in visit on 09/18/18   POCT urine dip   Result Value Ref Range    LEUKOCYTE ESTERASE,UA negative     NITRITE,UA negative     SL AMB POCT UROBILINOGEN 0 2     SL AMB POCT URINE PROTEIN positive      PH,UA 6      BLOOD,UA negative     SPECIFIC GRAVITY,UA 1 015     KETONES,UA negative      BILIRUBIN,UA negative     GLUCOSE, UA negative      COLOR,UA yellow      CLARITY,UA clear

## 2018-09-18 NOTE — PATIENT INSTRUCTIONS
ASSESSMENT and PLAN:  1  Elevated serum creatinine of 1 37, estimated GFR 62, dipstick showing proteinuria, may represent early onset kidney disease, likely secondary to hyper filtration given obesity, +/- hypertension  2  Dipstick positive for proteinuria, check microalbumin to creatinine ratio  3  Obstructive sleep apnea, CPAP  4  Obesity, workup pending for possible bariatric surgery    · At this point time would like to repeat laboratory studies to see if there is any significant change in renal function  · Quantify proteinuria  · Check renal ultrasound for size and echogenicity  · If renal function is stable and he has minimal proteinuria, can proceed with bariatric surgery  We may consider antihypertensive agent if he has significant proteinuria, likely low-dose lisinopril or losartan

## 2018-10-17 ENCOUNTER — OFFICE VISIT (OUTPATIENT)
Dept: SLEEP CENTER | Facility: CLINIC | Age: 43
End: 2018-10-17
Payer: COMMERCIAL

## 2018-10-17 ENCOUNTER — TELEPHONE (OUTPATIENT)
Dept: BARIATRICS | Facility: CLINIC | Age: 43
End: 2018-10-17

## 2018-10-17 VITALS
SYSTOLIC BLOOD PRESSURE: 124 MMHG | DIASTOLIC BLOOD PRESSURE: 78 MMHG | BODY MASS INDEX: 41.75 KG/M2 | HEART RATE: 66 BPM | OXYGEN SATURATION: 97 % | WEIGHT: 315 LBS | HEIGHT: 73 IN

## 2018-10-17 DIAGNOSIS — E66.01 MORBID (SEVERE) OBESITY DUE TO EXCESS CALORIES (HCC): ICD-10-CM

## 2018-10-17 DIAGNOSIS — Z99.89 OBSTRUCTIVE SLEEP APNEA TREATED WITH CONTINUOUS POSITIVE AIRWAY PRESSURE (CPAP): Primary | ICD-10-CM

## 2018-10-17 DIAGNOSIS — G47.33 OBSTRUCTIVE SLEEP APNEA TREATED WITH CONTINUOUS POSITIVE AIRWAY PRESSURE (CPAP): Primary | ICD-10-CM

## 2018-10-17 PROCEDURE — 99213 OFFICE O/P EST LOW 20 MIN: CPT | Performed by: NURSE PRACTITIONER

## 2018-10-17 NOTE — PATIENT INSTRUCTIONS
1   Continue use of CPAP equipment nightly  2  Continue to clean your equipment, as discussed  3  Contact the [facility] with any questions or concerns prior to your next visit, as needed  4  Schedule visit for follow-up in 6 months  5  If you are viewing the [employer]  [place], the goal for AHI is less than 5  This will fluctuate from day to day  If you are seeing consistently larger numbers, please contact our office

## 2018-10-17 NOTE — PROGRESS NOTES
Progress Note - [address] [facility] 37 y o  male   :1975, MRN: [id number]  10/17/2018          Follow Up Evaluation / Problem:     Obstructive Sleep Apnea    HPI: Zaida David is a 37y o  year old male  He presents for follow-up of obstructive sleep apnea  He was referred by Dr Harmony Abbott from the bariatric specialist team for evaluation of possible sleep apnea  He completed a diagnostic sleep study in July and was found to have moderate to severe sleep apnea  He completed a CPAP titration study within 2 weeks later and was titrated with CPAP to 8 cm of water pressure  He is here today to review his compliance data and effectiveness of CPAP treatment  Review of Systems      Genitourinary none   Cardiology none   Gastrointestinal none   Neurology none   Constitutional none   Integumentary none   Psychiatry none   Musculoskeletal none   Pulmonary none   ENT none   Endocrine none   Hematological none     No current outpatient prescriptions on file  [place] Sleepiness Scale  Sitting and reading: Slight chance of dozing  Watching TV: Slight chance of dozing  Sitting, inactive in a public place (e g  a theatre or a meeting): Would never doze  As a passenger in a car for an hour without a break: Moderate chance of dozing  Lying down to rest in the afternoon when circumstances permit: Slight chance of dozing  Sitting and talking to someone: Would never doze  Sitting quietly after a lunch without alcohol: Would never doze  In a car, while stopped for a few minutes in traffic: Would never doze  Total score: 5              Vitals:    10/17/18 0900   BP: 124/78   Pulse: 66   SpO2: 97%   Weight: (!) 172 kg (378 lb 9 6 oz)   Height: 6' 1" (1 854 m)       Body mass index is 49 95 kg/m²              EPWORTH SLEEPINESS SCORE  Total score: 5      Past History Since Last Sleep Center Visit:     He states that he uses his CPAP equipment every night and sleeps much better when using it   He denies frequent nighttime awakenings  He is much more refreshed upon awakening and has much more energy throughout his day  His morning headaches have resolved as well  He has been cleaning his equipment appropriately and changing the filters as directed  He continues to work on weight loss, but seems to be struggling with this  He reports that he has started a new job which has improved his sleep schedule  The review of systems and following portions of the patient's history were reviewed and updated as appropriate: allergies, current medications, past family history, past medical history, past social history, past surgical history, and problem list         OBJECTIVE    PAP Pressure: Nasal CPAP set to deliver 8 cm of water pressure  DME Provider: [employer]    Physical Exam:     General Appearance:   Alert, cooperative, no distress, appears stated age, morbidly obese     Head:   Normocephalic, without obvious abnormality, atraumatic     Eyes:   PERRL, conjunctiva/corneas clear, EOM's intact          Nose:  Nares normal, septum midline, no drainage or sinus tenderness           Throat:  Lips, teeth and gums normal; tongue normal size and  shape and midline mucosa  Moist redundant bilaterally, uvula thick at the base and dipping below the base of the tongue, tonsils  Not visualized, Mallampati class 4       Neck:  Supple, symmetrical, trachea midline, no adenopathy; Thyroid: No enlargement, tenderness or nodules; no carotid bruit or JVD     Lungs:      Clear to auscultation bilaterally, respirations unlabored     Heart:   Regular rate and rhythm, S1 and S2 normal, no murmur, rub or gallop       Extremities:  Extremities normal, atraumatic, no cyanosis or edema     Pulses:  2+ and symmetric all extremities     Skin:  Skin color, texture, turgor normal, no rashes or lesions           ASSESSMENT / PLAN    1   Obstructive sleep apnea treated with continuous positive airway pressure (CPAP) PAP DME Resupply/Reorder   2  Morbid (severe) obesity due to excess calories ([facility] [address] )             Counseling / Coordination of Care  Total clinic time spent today 15 minutes  Greater than 50% of total time was spent with the patient and / or family counseling and / or coordination of care  A description of the counseling / coordination of care:     Impressions, Diagnostic results, Prognosis, Instructions for management, Risks and benefits of treatment, Patient and family education, Risk factor reductions and Importance of compliance with treatment    Today I reviewed the patient's compliance data  he has been able to use the equipment   100% of all days recorded  Average usage was 4 or more hours  93 3% of all days recorded  The estimated AHI is  2 1 abnormal breathing events per hour  Response to treatment has been very good  Supplies have been ordered for the next year  We discussed the rotation of the various pieces of the  equipment to keep it in good working order  We discussed the use of the [employer] roland,  Which allows him to view his usage and effectiveness of treatment with a goal of AHI less than 5  He will contact our office if he is noticing consistently larger numbers  He will continue using this equipment at the settings noted above for the next 6 months  At that timehe will then return for a routine follow-up evaluation  I have asked him to contact the Sleep [address] [place] [address] if he encounters any difficulties prior to that time  He will continue to work on weight loss with the bariatrics team in preparation for his bariatric surgery  The following instructions have been given to the patient today:    Patient Instructions   1  Continue use of CPAP equipment nightly  2  Continue to clean your equipment, as discussed  3  Contact the Sleep [address] [place] [address] with any questions or concerns prior to your next visit, as needed  4  Schedule visit for follow-up in 6 months  5    If you are viewing the [employer]  roland, the goal for AHI is less than 5  This will fluctuate from day to day  If you are seeing consistently larger numbers, please contact our office          Lindsay Hyatt, [address] [facility]

## 2018-11-02 ENCOUNTER — OFFICE VISIT (OUTPATIENT)
Dept: BARIATRICS | Facility: CLINIC | Age: 43
End: 2018-11-02

## 2018-11-02 VITALS — BODY MASS INDEX: 41.75 KG/M2 | HEIGHT: 73 IN | WEIGHT: 315 LBS

## 2018-11-02 DIAGNOSIS — E66.01 OBESITY, CLASS III, BMI 40-49.9 (MORBID OBESITY) (HCC): Primary | ICD-10-CM

## 2018-11-02 PROBLEM — E66.813 OBESITY, CLASS III, BMI 40-49.9 (MORBID OBESITY): Status: ACTIVE | Noted: 2018-04-24

## 2018-11-02 PROCEDURE — RECHECK: Performed by: DIETITIAN, REGISTERED

## 2018-11-02 NOTE — PROGRESS NOTES
Bariatric Nutrition Assessment Note    Type of surgery    Preop  Surgery Date: TBD- no program requirement   Surgeon : Dr Geraldo Dykes  37 y o   male     Wt with BMI of 25: 188 6 lb   Pre-Op Excess Wt: 184 9 lb  Ht 6' 1" (1 854 m)   Wt (!) 170 kg (374 lb 6 4 oz)   BMI 49 40 kg/m²    Body mass index is 49 95 kg/m²  [facility] Equation:  BMR: 2648 kcal per day  Estimated calories for weight loss =8500-3728 kcal per day ( 1 to 2 pounds per week sedentary )   Estimated protein needs = 70-85 grams per day ( 8-1 0 grams/kg IBW)    Patient gained 3 4 pounds since last appointment     Review of History and Medications   Past Medical History:   Diagnosis Date    Anxiety     Morbid obesity ([address] )     Sleep apnea     Snoring      Past Surgical History:   Procedure Laterality Date    NO PAST SURGERIES      HI EGD TRANSORAL BIOPSY SINGLE/MULTIPLE N/A 5/16/2018    Procedure: ESOPHAGOGASTRODUODENOSCOPY (EGD) with bx;  Surgeon: John Leary MD;  Location: [facility]; Service: Bariatrics    WISDOM TOOTH EXTRACTION       Social History     Social History    Marital status: /Civil Union     Spouse name: N/A    Number of children: N/A    Years of education: N/A     Social History Main Topics    Smoking status: Never Smoker    Smokeless tobacco: Never Used    Alcohol use Yes      Comment: rare social    Drug use: No    Sexual activity: Yes     Birth control/ protection: Female Sterilization     Other Topics Concern    Not on file     Social History Narrative    Lives with wife and 2 kids    Part time work from home         No current outpatient prescriptions on file       Food Intake and Lifestyle Assessment   Food Intake Assessment completed via 24 hour recall  Patient reports that he has significantly cut back on red meat- maybe once to twice per week  Incorporating more vegetarian options including beans   Breakfast: eggs with toast, normally around 7 am   Snack: 0 Lunch: grilled chicken with salad   Snack: 0  Dinner: lean protein, vegetables and either rice or pasta or beans ( does not measure portion )  Snack: crackers or mixed nuts , white cheddar popcorn  Beverage intake: water, coffee/tea - has eliminated sugar sweetened beverages  Protein supplement: none currently   Estimated protein intake per day: 80-90 grams   Estimated fluid intake per day: 64 ounces or more per day   Meals eaten away from home: 2 per week   Typical meal pattern: 3 meals per day and 0-1 snacks per day  Eating Behaviors: Consumption of high calorie/ high fat foods, Large portion sizes, Craves sweet foods and stress eating   Food allergies or intolerances: Allergies   Allergen Reactions    Other      Seasonal allergies     Cultural or Restorationism considerations:      Physical Assessment  Physical Activity  Types of exercise: Tracks steps 5000 to 6000 per day   Current physical limitations: none noted, short on breath     Psychosocial Assessment   Support systems: spouse sibling(s) friend(s)   Socioeconomic factors: patient now has a full time job out of the home     Nutrition Diagnosis( continued )   Diagnosis: Overweight / Obesity (NC-3 3)  Related to: Physical inactivity and Excessive energy intake  As Evidenced by: BMI>35     Nutrition Prescription: Recommend the following diet  Regular     Interventions and Teaching   Discussed pre-op and post-op nutrition guidelines  Patient educated and handouts provided  Adequate hydration  Sugar and fat restriction to decrease "dumping syndrome"  Exercise  Suggestions for pre-op diet:  Recommended using meal replacements  Nutrition considerations after surgery  Protein supplements:  Provided with sample of Ensure Max, Bariatric Fusion protein powder and Unjury protein powder     Appropriate carbohydrate, protein, and fat intake, and food/fluid choices to maximize safe weight loss, nutrient intake, and tolerance Provided with macro and calorie goals :  2000 calories, 75 grams of protein, 78 grams of fat, 250 grams of carbohdyrate  Dietary and lifestyle changes Patient is following 30/60 rule   Possible problems with poor eating habits  Intuitive eating  Techniques for self monitoring and keeping daily food journal Reviewed use of the [employer] roland and entered in goals  Vitamin / Mineral supplementation of Multivitamin with minerals, Vitamin D and patient is currently not taking any vitamins or  Minerals, instructed to start a multivitamin and mineral plus vitamin D3-Recommended 2000 IU of vitamin D as his last vitamin D level was low     Patient would like to try meal replacements for weight loss Provided with following meal plan :  B:  Shake plus 1/2 cup fruit  L:  Shake plus side salad  D:  Lean protein, vegetable and 1/2 cup starch  S:  Light greek yogurt, SF pudding, SF jello, SF popsicles    Education provided to: patient    Barriers to learning: No barriers identified    Readiness to change: preparation    Comprehension: demonstrated understanding     Expected Compliance: good    Workflow:  Patient completed renal consult  Cardiology scheduled for beginning of December and he plans to attend support group with his wife in November  She is also working through the process for bariatric surgery     Evaluation / Monitoring  Dietitian to Monitor: Eating pattern as discussed Tolerance of nutrition prescription Body weight Lab values Physical activity  Patients admits to not being mindful about what he is eating  He has started tracking his steps 5000 to 6000 per day and has decreased his intake of red meats  He is practicing the 30/60 rule  Goals  1  Follow meal plan provided   2  Take 2000 IU of vitamin D3  3    Complete lesson plans in bariatric booklet          Time Spent:   30 minutes

## 2018-12-02 PROBLEM — Z01.810 PREOPERATIVE CARDIOVASCULAR EXAMINATION: Status: ACTIVE | Noted: 2018-12-02

## 2018-12-07 ENCOUNTER — TELEPHONE (OUTPATIENT)
Dept: BARIATRICS | Facility: CLINIC | Age: 43
End: 2018-12-07

## 2018-12-07 NOTE — TELEPHONE ENCOUNTER
Called and left message to schedule pre op weight check and dietary counseling  Also left message to attend support group and reschedule cardiology appointment that was cancelled  Name and contact information left

## 2018-12-18 ENCOUNTER — TELEPHONE (OUTPATIENT)
Dept: BARIATRICS | Facility: CLINIC | Age: 43
End: 2018-12-18
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
## 2019-01-30 ENCOUNTER — TELEPHONE (OUTPATIENT)
Dept: NEPHROLOGY | Facility: CLINIC | Age: 44
End: 2019-01-30

## 2019-02-05 NOTE — OP NOTE
Attempt to contact pt again in regards to below  Left message for patient to call back      [facility]   [address] [facility], [address]  [phone] (Fax)      Operative Report  ESOPHAGOGASTRODUODENOSCOPY (EGD)     Patient Name: Angelica Burns    :  1975  MRN: [id number]  Patient Location: [facility]  Surgery Date : 2018  Surgeons:  Surgeon(s) and Role:     * Chalmer Apley, MD - Primary     * Rosaura Michaels MD    Diagnosis:    Pre-Op Diagnosis Codes: Morbid obesity ([facility] [address] ) [E66 01]  Body mass index is 48 95 kg/m²  Post-Op Diagnosis Codes:     * Morbid obesity ([facility] [address] ) [E66 01]     * Body mass index is 48 95 kg/m²  * Gastritis    Procedure  1  Endoscopy with Biopsy    Specimen(s):  ID Type Source Tests Collected by Time Destination   1 : gastric antrum bx, R/O H  Pylori Tissue Stomach TISSUE EXAM Chalmer Apley, MD 2018 [id number]        Estimated Blood Loss:    Minimal      Anesthesia Type:     IV Sedation with Anesthesia    Operative Indications: Morbid obesity ([facility] [address] ) [E66 01]  Body mass index is 48 95 kg/m²  Operative Findings:    Gastritis    Complications:     None    Procedure and Technique:       Indication for the procedure     The patient is a 43 y o  male with a long standing history of morbid obesity who presented to the office for a bariatric operation  The risks, benefits and alternatives to the procedure were discussed with the patient and informed consent was obtained for an upper endoscopy and biopsy to asses the anatomy of the GI tract prior to the surgical procedure      Operative Technique     The patient was brought to the GI suite and was placed in a supine position  EKG trace, pulse, blood pressure and oxygen saturation were monitored throughout the procedure  A time out was called and the patient was identified by name and arm band  The patient was placed in a left lateral decubitus position and received IV sedation with propofol by the anesthesia staff    The endoscope was introduced through the mouth and advanced under under direct visualization  The esophagus was normal in appearance  The stomach showed evidence of  inflammation  There were no mucosal lesions, polyps nor ulcerations    The first and second portions of the duodenum were inspected and were normal in appearance  A biopsy was taken from the antrum times two with a forceps grasper to rule out the presence of H  Pylori  A retroflex view of the GE junction was obtained and was normal in appearance     The GE junction was again inspected upon withdrawing the scope and was normal in appearance      Impression     Gastritis        Patient Disposition:    PACU     Signature: Escobar Love MD  Date: May 16, 2018  Time: 10:52 AM

## 2019-02-07 ENCOUNTER — TELEPHONE (OUTPATIENT)
Dept: NEPHROLOGY | Facility: CLINIC | Age: 44
End: 2019-02-07

## 2019-02-08 ENCOUNTER — CONSULT (OUTPATIENT)
Dept: CARDIOLOGY CLINIC | Facility: CLINIC | Age: 44
End: 2019-02-08
Payer: COMMERCIAL

## 2019-02-08 VITALS
HEIGHT: 73 IN | HEART RATE: 61 BPM | DIASTOLIC BLOOD PRESSURE: 62 MMHG | WEIGHT: 315 LBS | OXYGEN SATURATION: 96 % | SYSTOLIC BLOOD PRESSURE: 124 MMHG | BODY MASS INDEX: 41.75 KG/M2

## 2019-02-08 DIAGNOSIS — Z99.89 OSA ON CPAP: ICD-10-CM

## 2019-02-08 DIAGNOSIS — Z01.818 PRE-OPERATIVE CLEARANCE: Primary | ICD-10-CM

## 2019-02-08 DIAGNOSIS — Z01.810 PREOPERATIVE CARDIOVASCULAR EXAMINATION: ICD-10-CM

## 2019-02-08 DIAGNOSIS — G47.33 OSA ON CPAP: ICD-10-CM

## 2019-02-08 PROCEDURE — 99204 OFFICE O/P NEW MOD 45 MIN: CPT | Performed by: INTERNAL MEDICINE

## 2019-02-08 PROCEDURE — 93000 ELECTROCARDIOGRAM COMPLETE: CPT | Performed by: INTERNAL MEDICINE

## 2019-02-08 NOTE — LETTER
February 8, 2019     Manfred Fernandez MD  [address]    Patient: Jacqui Smith   YOB: 1975   Date of Visit: 2/8/2019       Dear Dr Monique Serrano:    Thank you for referring Jacqui Smith to me for evaluation  Below are my notes for this consultation  If you have questions, please do not hesitate to call me  I look forward to following your patient along with you  Sincerely,        Jolynn Zavala MD        CC: MD Jolynn Burgess MD  2/8/2019  9:32 AM  [facility]   [facility]    [address], [place] [address]  Phone#  [phone]  Fax#  [phone]  *-*-*-*-*-*-*-*-*-*-*-*-*-*-*-*-*-*-*-*-*-*-*-*-*-*-*-*-*-*-*-*-*-*-*-*-*-*-*-*-*-*-*-*-*-*-*-*-*-*-*-*-*-*    ENCOUNTER DATE: 02/08/19 9:17 AM  PATIENT NAME: Jacqui Smith   1975    [id number]  Age: 37 y o  Sex: male  AUTHOR: Jolynn Zavala MD  PRIMARYCARE PHYSICIAN: Manfred Fernandez MD  *-*-*-*-*-*-*-*-*-*-*-*-*-*-*-*-*-*-*-*-*-*-*-*-*-*-*-*-*-*-*-*-*-*-*-*-*-*-*-*-*-*-*-*-*-*-*-*-*-*-*-*-*-*-  REASON FOR REFERRAL:  Preoperative cardiac risk assessment prior to bariatric gastric bypass surgery  *-*-*-*-*-*-*-*-*-*-*-*-*-*-*-*-*-*-*-*-*-*-*-*-*-*-*-*-*-*-*-*-*-*-*-*-*-*-*-*-*-*-*-*-*-*-*-*-*-*-*-*-*-*-  CARDIOLOGY ASSESSMENT & PLAN:  No problem-specific Assessment & Plan notes found for this encounter  *-*-*-*-*-*-*-*-*-*-*-*-*-*-*-*-*-*-*-*-*-*-*-*-*-*-*-*-*-*-*-*-*-*-*-*-*-*-*-*-*-*-*-*-*-*-*-*-*-*-*-*-*-*-  CURRENT ECG:  No results found for this visit on 02/08/19  *-*-*-*-*-*-*-*-*-*-*-*-*-*-*-*-*-*-*-*-*-*-*-*-*-*-*-*-*-*-*-*-*-*-*-*-*-*-*-*-*-*-*-*-*-*-*-*-*-*-*-*-*-*-  HISTORY OF PRESENT ILLNESS:  Patient is a pleasant 77-year-old gentleman with past medical history significant for:  1  History of morbid obesity  2  History of obstructive sleep apnea on CPAP  3  Mild renal insufficiency withmild proteinuria  4   Diagnoses of pre hypertension, not on any antihypertensive therapy  5  Mild anxiety     He is being considered for Jaquelin-en-Y gastric bypass surgery which has not yet been scheduled  He has been in the bariatric weight management program since March 2018 and has been unable to achieve target weight with dietary and exercise measures  He has no previous history of coronary artery disease, diabetes, TIA/CVA or congestive heart failure or arrhythmia  From a symptom perspective he has had no significant symptoms  There have been no recent active symptoms of chest discomfort or exertional angina  There is no dyspnea with usual activities or exertion  No orthopnea, PND or pedal edema  No palpitations, lightheadedness, presyncope, or syncope  Denies any recent hospitalizations or other illnesses  Reports being compliant with medications  He is physically active and his work does require him to walk a lot and he has not experienced any decline in his exercise tolerance  *-*-*-*-*-*-*-*-*-*-*-*-*-*-*-*-*-*-*-*-*-*-*-*-*-*-*-*-*-*-*-*-*-*-*-*-*-*-*-*-*-*-*-*-*-*-*-*-*-*-*-*-*-*  PAST MEDICAL HISTORY:   Past Medical History:   Diagnosis Date    Anxiety     Morbid obesity ([address] )     Sleep apnea     Snoring        PAST SURGICAL HISTORY:   Past Surgical History:   Procedure Laterality Date    NO PAST SURGERIES      [facility] EGD TRANSORAL BIOPSY SINGLE/MULTIPLE N/A 5/16/2018    Procedure: ESOPHAGOGASTRODUODENOSCOPY (EGD) with bx;  Surgeon: Fidelina Da Silva MD;  Location: [facility]; Service: Bariatrics    WISDOM TOOTH EXTRACTION         FAMILY HISTORY:  Family History   Problem Relation Age of Onset    Cancer Mother         leukemia    Dementia Father      There is no family history of premature coronary artery disease or sudden cardiac death      SOCIAL HISTORY:  [unfilled]  History   Smoking Status    Never Smoker   Smokeless Tobacco    Never Used     History   Alcohol Use    Yes     Comment: rare social     History   Drug Use No     He works for the [employer]  *-*-*-*-*-*-*-*-*-*-*-*-*-*-*-*-*-*-*-*-*-*-*-*-*-*-*-*-*-*-*-*-*-*-*-*-*-*-*-*-*-*-*-*-*-*-*-*-*-*-*-*-*-*  ALLERGIES:  Allergies   Allergen Reactions    Other      Seasonal allergies     *-*-*-*-*-*-*-*-*-*-*-*-*-*-*-*-*-*-*-*-*-*-*-*-*-*-*-*-*-*-*-*-*-*-*-*-*-*-*-*-*-*-*-*-*-*-*-*-*-*-*-*-*-*  CURRENT OUTPATIENT MEDICATIONS:   No current outpatient prescriptions on file  *-*-*-*-*-*-*-*-*-*-*-*-*-*-*-*-*-*-*-*-*-*-*-*-*-*-*-*-*-*-*-*-*-*-*-*-*-*-*-*-*-*-*-*-*-*-*-*-*-*-*-*-*-*  REVIEW OF SYMPTOMS:    Positive for:  No significant symptoms reported  Reports occasional lower extremity swelling which is noticed at the end of the day  Negative for: All remaining as reviewed below and in HPI  SYSTEM SYMPTOMS REVIEWED:  Generalweight change, fever, night sweats  Respirato  Cardiovascularchest pain, syncope, dyspnea on exertion, edema, decline in exercise tolerance, claudication   Gastrointestinalpersistent vomiting, diarrhea, abdominal distention, blood in stool   Muscular or skeletaljoint pain or swelling   Neurologicheadaches, syncope, abnormal movement  Hematologichistory of easy bruising and bleeding   Endocrinethyroid enlargement, heat or cold intolerance, polyuria   Psychiatricanxiety, depression     *-*-*-*-*-*-*-*-*-*-*-*-*-*-*-*-*-*-*-*-*-*-*-*-*-*-*-*-*-*-*-*-*-*-*-*-*-*-*-*-*-*-*-*-*-*-*-*-*-*-*-*-*-*-  VITAL SIGNS:  Vitals:    02/08/19 0825   BP: 124/62   BP Location: Left arm   Patient Position: Sitting   Cuff Size: Large   Pulse: 61   SpO2: 96%   Weight: (!) 170 kg (375 lb)   Height: 6' 1" (1 854 m)       *-*-*-*-*-*-*-*-*-*-*-*-*-*-*-*-*-*-*-*-*-*-*-*-*-*-*-*-*-*-*-*-*-*-*-*-*-*-*-*-*-*-*-*-*-*-*-*-*-*-*-*-*-*-  PHYSICAL EXAM:  General Appearance:    Alert, cooperative, no distress, appears stated age, large built, obese   Head, Eyes, ENT:    No obvious abnormality, moist mucous mebranes  Neck:   Supple, no carotid bruit or JVD   Back:     Symmetric, no curvature  Lungs:     Respirations unlabored  Clear to auscultation bilaterally,    Chest wall:    No tenderness or deformity   Heart:    Regular rate and rhythm, S1 and S2 normal, no murmur, rub  or gallop  Abdomen:     Soft, non-tender, No obvious masses, or organomegaly   Extremities:   Extremities normal, no cyanosis or edema    Skin:   Skin color, texture, turgor normal, no rashes or lesions     *-*-*-*-*-*-*-*-*-*-*-*-*-*-*-*-*-*-*-*-*-*-*-*-*-*-*-*-*-*-*-*-*-*-*-*-*-*-*-*-*-*-*-*-*-*-*-*-*-*-*-*-*-*-  LABORATORY DATA:  I have personally reviewed pertinent labs  Potassium   Date Value Ref Range Status   08/14/2018 4 0 3 5 - 5 3 mmol/L Final     Chloride   Date Value Ref Range Status   08/14/2018 107 100 - 108 mmol/L Final     CO2   Date Value Ref Range Status   08/14/2018 26 21 - 32 mmol/L Final     BUN   Date Value Ref Range Status   08/14/2018 16 5 - 25 mg/dL Final     Creatinine   Date Value Ref Range Status   08/14/2018 1 37 (H) 0 60 - 1 30 mg/dL Final     Comment:     Standardized to IDMS reference method     eGFR   Date Value Ref Range Status   08/14/2018 63 ml/min/1 73sq m Final     Calcium   Date Value Ref Range Status   08/14/2018 8 9 8 3 - 10 1 mg/dL Final     AST   Date Value Ref Range Status   08/14/2018 17 5 - 45 U/L Final     Comment:       Specimen collection should occur prior to Sulfasalazine administration due to the potential for falsely depressed results  ALT   Date Value Ref Range Status   08/14/2018 33 12 - 78 U/L Final     Comment:       Specimen collection should occur prior to Sulfasalazine and/or Sulfapyridine administration due to the potential for falsely depressed results        Alkaline Phosphatase   Date Value Ref Range Status   08/14/2018 86 46 - 116 U/L Final     WBC   Date Value Ref Range Status   08/14/2018 5 42 4 31 - 10 16 Thousand/uL Final     Hemoglobin   Date Value Ref Range Status   08/14/2018 13 6 12 0 - 17 0 g/dL Final     Platelets   Date Value Ref Range Status 08/14/2018 245 149 - 390 Thousands/uL Final     No results found for: PT, PTT, INR  No results found for: CKMB, BNP, DIGOXIN  No results found for: TSH  HDL, Direct   Date Value Ref Range Status   08/14/2018 27 (L) 40 - 60 mg/dL Final     Comment:       HDL Cholesterol:       High    >60 mg/dL       Low     <41 mg/dL  Specimen collection should occur prior to Metamizole administration due to the potential for falsley depressed results  Triglycerides   Date Value Ref Range Status   08/14/2018 102 <=150 mg/dL Final     Comment:       Triglyceride:     Normal          <150 mg/dl     Borderline High 150-199 mg/dl     High            200-499 mg/dl        Very High       >499 mg/dl    Specimen collection should occur prior to N-Acetylcysteine or Metamizole administration due to the potential for falsely depressed results  No results found for: HGBA1C  No results found for: Vallerie Flatness, GRAMSTAIN, URINECX, WOUNDCULT, BODYFLUIDCUL, MRSACULTURE, INFLUAPCR, INFLUBPCR, RSVPCR, LEGIONELLAUR, CDIFFTOXINB    *-*-*-*-*-*-*-*-*-*-*-*-*-*-*-*-*-*-*-*-*-*-*-*-*-*-*-*-*-*-*-*-*-*-*-*-*-*-*-*-*-*-*-*-*-*-*-*-*-*-*-*-*-*-  RADIOLOGY RESULTS:  No results found  *-*-*-*-*-*-*-*-*-*-*-*-*-*-*-*-*-*-*-*-*-*-*-*-*-*-*-*-*-*-*-*-*-*-*-*-*-*-*-*-*-*-*-*-*-*-*-*-*-*-*-*-*-*-  CURRENT ECG:      ECHOCARDIOGRAM AND OTHER CARDIOLOGY RESULTS:  No results found for this or any previous visit  No results found for this or any previous visit  No results found for this or any previous visit  No results found for this or any previous visit       *-*-*-*-*-*-*-*-*-*-*-*-*-*-*-*-*-*-*-*-*-*-*-*-*-*-*-*-*-*-*-*-*-*-*-*-*-*-*-*-*-*-*-*-*-*-*-*-*-*-*-*-*-*-  SIGNATURES:   [unfilled]   Tila Polanco MD     CC:   MD Saurav Gallegos MD

## 2019-02-08 NOTE — PROGRESS NOTES
[facility]    [facility] [address] [facility], [place] [address]  Phone#  [phone]  Fax#  [phone]  *-*-*-*-*-*-*-*-*-*-*-*-*-*-*-*-*-*-*-*-*-*-*-*-*-*-*-*-*-*-*-*-*-*-*-*-*-*-*-*-*-*-*-*-*-*-*-*-*-*-*-*-*-*    ENCOUNTER DATE: 02/08/19 9:37 AM  PATIENT NAME: Анна Salguero   1975    [id number]  Age: 37 y o  Sex: male  AUTHOR: Jolynn Zavala MD  PRIMARYCARE PHYSICIAN: Raymond Mayorga MD  *-*-*-*-*-*-*-*-*-*-*-*-*-*-*-*-*-*-*-*-*-*-*-*-*-*-*-*-*-*-*-*-*-*-*-*-*-*-*-*-*-*-*-*-*-*-*-*-*-*-*-*-*-*-  REASON FOR REFERRAL:  Preoperative cardiac risk assessment prior to bariatric gastric bypass surgery  *-*-*-*-*-*-*-*-*-*-*-*-*-*-*-*-*-*-*-*-*-*-*-*-*-*-*-*-*-*-*-*-*-*-*-*-*-*-*-*-*-*-*-*-*-*-*-*-*-*-*-*-*-*-  CARDIOLOGY ASSESSMENT & PLAN:  Preoperative cardiovascular examination  Patient is a pleasant 60-year-old gentleman with risk factors obstructive sleep apnea and obesity who is being considered for Jaquelin-en-Y gastric bypass surgery under general anesthesia, procedure with inherent low to intermediate cardiac risk  He reports no recent symptoms suggestive of angina or congestive heart failure or arrhythmia  His exercise tolerance is good and greater than 4 Mets  His physical examination does not suggest significant valvular heart disease or evidence of decompensated congestive heart failure  His ECG is overall normal with no evidence prior MI or ongoing ischemia or significant chamber enlargement or hypertrophy  He has not yet had routine preoperative blood work and has not undergone PFT evaluation yet  His overall risk for major adverse cardiac event relating to planned surgery is low  However given that he has known history of obstructive sleep apnea it will be reasonable to get an echocardiogram to identified exclude pulmonary hypertension prior to the planned surgery      - he is being clear to undergo the gastric bypass surgery following routine testing including echocardiogram which is being ordered from our office  He will also need routine other blood work including CBC, CMP, TSH and lipid profile which may be ordered along with his any other preoperative testing     - Cardiology follow-upx1  following the gastric bypass surgery is advised  - Dietary and medical compliance are reinforced  - Advised  to report any concerning symptoms such as chest pain, shortness of breath, decline in exercise tolerance or presyncope/syncope  *-*-*-*-*-*-*-*-*-*-*-*-*-*-*-*-*-*-*-*-*-*-*-*-*-*-*-*-*-*-*-*-*-*-*-*-*-*-*-*-*-*-*-*-*-*-*-*-*-*-*-*-*-*-  CURRENT ECG:  Results for orders placed or performed in visit on 02/08/19   POCT ECG    Narrative    Sinus rhythm, HR 61 bpm, normal axis and intervals, no significant ST T wave abnormalities  No evidence of chamber hypertrophy or enlargement, no evidence of prior infarction  *-*-*-*-*-*-*-*-*-*-*-*-*-*-*-*-*-*-*-*-*-*-*-*-*-*-*-*-*-*-*-*-*-*-*-*-*-*-*-*-*-*-*-*-*-*-*-*-*-*-*-*-*-*-  HISTORY OF PRESENT ILLNESS:  Patient is a pleasant 45-year-old gentleman with past medical history significant for:  1  History of morbid obesity  2  History of obstructive sleep apnea on CPAP  3  Mild renal insufficiency withmild proteinuria  4  Diagnoses of pre hypertension, not on any antihypertensive therapy  5  Mild anxiety     He is being considered for Jaquelin-en-Y gastric bypass surgery which has not yet been scheduled  He has been in the bariatric weight management program since March 2018 and has been unable to achieve target weight with dietary and exercise measures  He has no previous history of coronary artery disease, diabetes, TIA/CVA or congestive heart failure or arrhythmia  From a symptom perspective he has had no significant symptoms  There have been no recent active symptoms of chest discomfort or exertional angina  There is no dyspnea with usual activities or exertion  No orthopnea, PND or pedal edema     No palpitations, lightheadedness, presyncope, or syncope  Denies any recent hospitalizations or other illnesses  Reports being compliant with medications  He is physically active and his work does require him to walk a lot and he has not experienced any decline in his exercise tolerance  *-*-*-*-*-*-*-*-*-*-*-*-*-*-*-*-*-*-*-*-*-*-*-*-*-*-*-*-*-*-*-*-*-*-*-*-*-*-*-*-*-*-*-*-*-*-*-*-*-*-*-*-*-*  PAST MEDICAL HISTORY:   Past Medical History:   Diagnosis Date    Anxiety     Morbid obesity ([address] )     Sleep apnea     Snoring        PAST SURGICAL HISTORY:   Past Surgical History:   Procedure Laterality Date    NO PAST SURGERIES      [facility] TRANSORAL BIOPSY SINGLE/MULTIPLE N/A 5/16/2018    Procedure: ESOPHAGOGASTRODUODENOSCOPY (EGD) with bx;  Surgeon: Tyesha Mead MD;  Location: [facility]; Service: Bariatrics    WISDOM TOOTH EXTRACTION         FAMILY HISTORY:  Family History   Problem Relation Age of Onset    Cancer Mother         leukemia    Dementia Father      There is no family history of premature coronary artery disease or sudden cardiac death  SOCIAL HISTORY:  [unfilled]  History   Smoking Status    Never Smoker   Smokeless Tobacco    Never Used     History   Alcohol Use    Yes     Comment: rare social     History   Drug Use No     He works for the [employer]  *-*-*-*-*-*-*-*-*-*-*-*-*-*-*-*-*-*-*-*-*-*-*-*-*-*-*-*-*-*-*-*-*-*-*-*-*-*-*-*-*-*-*-*-*-*-*-*-*-*-*-*-*-*  ALLERGIES:  Allergies   Allergen Reactions    Other      Seasonal allergies     *-*-*-*-*-*-*-*-*-*-*-*-*-*-*-*-*-*-*-*-*-*-*-*-*-*-*-*-*-*-*-*-*-*-*-*-*-*-*-*-*-*-*-*-*-*-*-*-*-*-*-*-*-*  CURRENT OUTPATIENT MEDICATIONS:   No current outpatient prescriptions on file  *-*-*-*-*-*-*-*-*-*-*-*-*-*-*-*-*-*-*-*-*-*-*-*-*-*-*-*-*-*-*-*-*-*-*-*-*-*-*-*-*-*-*-*-*-*-*-*-*-*-*-*-*-*  REVIEW OF SYMPTOMS:    Positive for:  No significant symptoms reported  Reports occasional lower extremity swelling which is noticed at the end of the day    Negative for: All remaining as reviewed below and in HPI  SYSTEM SYMPTOMS REVIEWED:  General--weight change, fever, night sweats  Respirato  Cardiovascular--chest pain, syncope, dyspnea on exertion, edema, decline in exercise tolerance, claudication   Gastrointestinal--persistent vomiting, diarrhea, abdominal distention, blood in stool   Muscular or skeletal--joint pain or swelling   Neurologic--headaches, syncope, abnormal movement  Hematologic--history of easy bruising and bleeding   Endocrine--thyroid enlargement, heat or cold intolerance, polyuria   Psychiatric--anxiety, depression     *-*-*-*-*-*-*-*-*-*-*-*-*-*-*-*-*-*-*-*-*-*-*-*-*-*-*-*-*-*-*-*-*-*-*-*-*-*-*-*-*-*-*-*-*-*-*-*-*-*-*-*-*-*-  VITAL SIGNS:  Vitals:    02/08/19 0825   BP: 124/62   BP Location: Left arm   Patient Position: Sitting   Cuff Size: Large   Pulse: 61   SpO2: 96%   Weight: (!) 170 kg (375 lb)   Height: 6' 1" (1 854 m)       *-*-*-*-*-*-*-*-*-*-*-*-*-*-*-*-*-*-*-*-*-*-*-*-*-*-*-*-*-*-*-*-*-*-*-*-*-*-*-*-*-*-*-*-*-*-*-*-*-*-*-*-*-*-  PHYSICAL EXAM:  General Appearance:    Alert, cooperative, no distress, appears stated age, large built, obese   Head, Eyes, ENT:    No obvious abnormality, moist mucous mebranes  Neck:   Supple, no carotid bruit or JVD   Back:     Symmetric, no curvature  Lungs:     Respirations unlabored  Clear to auscultation bilaterally,    Chest wall:    No tenderness or deformity   Heart:    Regular rate and rhythm, S1 and S2 normal, no murmur, rub  or gallop  Abdomen:     Soft, non-tender, No obvious masses, or organomegaly   Extremities:   Extremities normal, no cyanosis or edema    Skin:   Skin color, texture, turgor normal, no rashes or lesions     *-*-*-*-*-*-*-*-*-*-*-*-*-*-*-*-*-*-*-*-*-*-*-*-*-*-*-*-*-*-*-*-*-*-*-*-*-*-*-*-*-*-*-*-*-*-*-*-*-*-*-*-*-*-  LABORATORY DATA:  I have personally reviewed pertinent labs      Potassium   Date Value Ref Range Status   08/14/2018 4 0 3 5 - 5 3 mmol/L Final     Chloride   Date Value Ref Range Status   08/14/2018 107 100 - 108 mmol/L Final     CO2   Date Value Ref Range Status   08/14/2018 26 21 - 32 mmol/L Final     BUN   Date Value Ref Range Status   08/14/2018 16 5 - 25 mg/dL Final     Creatinine   Date Value Ref Range Status   08/14/2018 1 37 (H) 0 60 - 1 30 mg/dL Final     Comment:     Standardized to IDMS reference method     eGFR   Date Value Ref Range Status   08/14/2018 63 ml/min/1 73sq m Final     Calcium   Date Value Ref Range Status   08/14/2018 8 9 8 3 - 10 1 mg/dL Final     AST   Date Value Ref Range Status   08/14/2018 17 5 - 45 U/L Final     Comment:       Specimen collection should occur prior to Sulfasalazine administration due to the potential for falsely depressed results  ALT   Date Value Ref Range Status   08/14/2018 33 12 - 78 U/L Final     Comment:       Specimen collection should occur prior to Sulfasalazine and/or Sulfapyridine administration due to the potential for falsely depressed results  Alkaline Phosphatase   Date Value Ref Range Status   08/14/2018 86 46 - 116 U/L Final     WBC   Date Value Ref Range Status   08/14/2018 5 42 4 31 - 10 16 Thousand/uL Final     Hemoglobin   Date Value Ref Range Status   08/14/2018 13 6 12 0 - 17 0 g/dL Final     Platelets   Date Value Ref Range Status   08/14/2018 245 149 - 390 Thousands/uL Final     No results found for: PT, PTT, INR  No results found for: CKMB, BNP, DIGOXIN  No results found for: TSH  HDL, Direct   Date Value Ref Range Status   08/14/2018 27 (L) 40 - 60 mg/dL Final     Comment:       HDL Cholesterol:       High    >60 mg/dL       Low     <41 mg/dL  Specimen collection should occur prior to Metamizole administration due to the potential for falsley depressed results       Triglycerides   Date Value Ref Range Status   08/14/2018 102 <=150 mg/dL Final     Comment:       Triglyceride:     Normal          <150 mg/dl     Borderline High 150-199 mg/dl     High            200-499 mg/dl        Very High >499 mg/dl    Specimen collection should occur prior to N-Acetylcysteine or Metamizole administration due to the potential for falsely depressed results  No results found for: HGBA1C  No results found for: Curtis Lauth, GRAMSTAIN, URINECX, WOUNDCULT, BODYFLUIDCUL, MRSACULTURE, INFLUAPCR, INFLUBPCR, RSVPCR, LEGIONELLAUR, CDIFFTOXINB    *-*-*-*-*-*-*-*-*-*-*-*-*-*-*-*-*-*-*-*-*-*-*-*-*-*-*-*-*-*-*-*-*-*-*-*-*-*-*-*-*-*-*-*-*-*-*-*-*-*-*-*-*-*-  RADIOLOGY RESULTS:  No results found  *-*-*-*-*-*-*-*-*-*-*-*-*-*-*-*-*-*-*-*-*-*-*-*-*-*-*-*-*-*-*-*-*-*-*-*-*-*-*-*-*-*-*-*-*-*-*-*-*-*-*-*-*-*-  CURRENT ECG:      ECHOCARDIOGRAM AND OTHER CARDIOLOGY RESULTS:  No results found for this or any previous visit  No results found for this or any previous visit  No results found for this or any previous visit  No results found for this or any previous visit       *-*-*-*-*-*-*-*-*-*-*-*-*-*-*-*-*-*-*-*-*-*-*-*-*-*-*-*-*-*-*-*-*-*-*-*-*-*-*-*-*-*-*-*-*-*-*-*-*-*-*-*-*-*-  SIGNATURES:   @CDI@   Betina Rahman MD     CC:   MD Deep Botello MD

## 2019-02-08 NOTE — ASSESSMENT & PLAN NOTE
Patient is a pleasant 51-year-old gentleman with risk factors obstructive sleep apnea and obesity who is being considered for Jaquelin-en-Y gastric bypass surgery under general anesthesia, procedure with inherent low to intermediate cardiac risk  He reports no recent symptoms suggestive of angina or congestive heart failure or arrhythmia  His exercise tolerance is good and greater than 4 Mets  His physical examination does not suggest significant valvular heart disease or evidence of decompensated congestive heart failure  His ECG is overall normal with no evidence prior MI or ongoing ischemia or significant chamber enlargement or hypertrophy  He has not yet had routine preoperative blood work and has not undergone PFT evaluation yet  His overall risk for major adverse cardiac event relating to planned surgery is low  However given that he has known history of obstructive sleep apnea it will be reasonable to get an echocardiogram to identified exclude pulmonary hypertension prior to the planned surgery  - he is being clear to undergo the gastric bypass surgery following routine testing including echocardiogram which is being ordered from our office  He will also need routine other blood work including CBC, CMP, TSH and lipid profile which may be ordered along with his any other preoperative testing     - Cardiology follow-upx1  following the gastric bypass surgery is advised  - Dietary and medical compliance are reinforced  - Advised  to report any concerning symptoms such as chest pain, shortness of breath, decline in exercise tolerance or presyncope/syncope

## 2019-02-08 NOTE — PATIENT INSTRUCTIONS
CARDIOLOGY ASSESSMENT & PLAN:  Preoperative cardiovascular examination  Patient is a pleasant 49-year-old gentleman with risk factors obstructive sleep apnea and obesity who is being considered for Jaquelin-en-Y gastric bypass surgery under general anesthesia, procedure with inherent low to intermediate cardiac risk  He reports no recent symptoms suggestive of angina or congestive heart failure or arrhythmia  His exercise tolerance is good and greater than 4 Mets  His physical examination does not suggest significant valvular heart disease or evidence of decompensated congestive heart failure  His ECG is overall normal with no evidence prior MI or ongoing ischemia or significant chamber enlargement or hypertrophy  He has not yet had routine preoperative blood work and has not undergone PFT evaluation yet  His overall risk for major adverse cardiac event relating to planned surgery is low  However given that he has known history of obstructive sleep apnea it will be reasonable to get an echocardiogram to identified exclude pulmonary hypertension prior to the planned surgery  - he is being clear to undergo the gastric bypass surgery following routine testing including echocardiogram which is being ordered from our office  He will also need routine other blood work including CBC, CMP, TSH and lipid profile which may be ordered along with his any other preoperative testing     - Cardiology follow-upx1  following the gastric bypass surgery is advised  - Dietary and medical compliance are reinforced  - Advised  to report any concerning symptoms such as chest pain, shortness of breath, decline in exercise tolerance or presyncope/syncope

## 2019-02-15 ENCOUNTER — OFFICE VISIT (OUTPATIENT)
Dept: BARIATRICS | Facility: CLINIC | Age: 44
End: 2019-02-15

## 2019-02-15 VITALS — HEIGHT: 73 IN | WEIGHT: 315 LBS | BODY MASS INDEX: 41.75 KG/M2

## 2019-02-15 DIAGNOSIS — E66.01 OBESITY, CLASS III, BMI 40-49.9 (MORBID OBESITY) (HCC): Primary | ICD-10-CM

## 2019-02-15 PROCEDURE — RECHECK: Performed by: DIETITIAN, REGISTERED

## 2019-02-15 NOTE — PROGRESS NOTES
Bariatric Nutrition Assessment Note    Type of surgery    Preop  Surgery Date: Insurance changed January 2019- patient now has 6 month program requirement   Feb- July  Surgeon : Dr Bin Shaw  Today is 1/6 of 6 month program requirement     Nutrition Assessment   Ta Mckeon  37 y o   male     Wt with BMI of 25: 188 6 lb   Pre-Op Excess Wt: 184 9 lb  Ht 6' 1" (1 854 m)   Wt (!) 171 kg (377 lb 14 4 oz)   BMI 49 86 kg/m²      Patient gained 3 pounds since last dietary appointment  He has gained 4 4 pounds since starting the program       [place] Equation:  BMR: 2648 kcal per day  Estimated calories for weight loss =5423-6743 kcal per day ( 1 to 2 pounds per week sedentary )   Estimated protein needs = 70-85 grams per day ( 8-1 0 grams/kg IBW)        Review of History and Medications   Past Medical History:   Diagnosis Date    Anxiety     Morbid obesity ([address] )     Sleep apnea     Snoring      Past Surgical History:   Procedure Laterality Date    NO PAST SURGERIES      [place] EGD TRANSORAL BIOPSY SINGLE/MULTIPLE N/A 5/16/2018    Procedure: ESOPHAGOGASTRODUODENOSCOPY (EGD) with bx;  Surgeon: Leigh Mota MD;  Location: [facility];   Service: Bariatrics    WISDOM TOOTH EXTRACTION       Social History     Socioeconomic History    Marital status: /Civil Union     Spouse name: Not on file    Number of children: Not on file    Years of education: Not on file    Highest education level: Not on file   Occupational History    Not on file   Social Needs    Financial resource strain: Not on file    Food insecurity:     Worry: Not on file     Inability: Not on file    Transportation needs:     Medical: Not on file     Non-medical: Not on file   Tobacco Use    Smoking status: Never Smoker    Smokeless tobacco: Never Used   Substance and Sexual Activity    Alcohol use: Yes     Comment: rare social    Drug use: No    Sexual activity: Yes     Birth control/protection: Female Sterilization   Lifestyle    Physical activity:     Days per week: Not on file     Minutes per session: Not on file    Stress: Not on file   Relationships    Social connections:     Talks on phone: Not on file     Gets together: Not on file     Attends Lutheran service: Not on file     Active member of club or organization: Not on file     Attends meetings of clubs or organizations: Not on file     Relationship status: Not on file    Intimate partner violence:     Fear of current or ex partner: Not on file     Emotionally abused: Not on file     Physically abused: Not on file     Forced sexual activity: Not on file   Other Topics Concern    Not on file   Social History Narrative    Lives with wife and 2 kids    Part time work from home     No current outpatient medications on file  Food Intake and Lifestyle Assessment   Food Intake Assessment completed via 24 hour recall  Patient reports that he has significantly cut back on red meat- maybe once to twice per week  Incorporating more vegetarian options including beans   Breakfast: eggs with toast, normally around 7 am   Snack: 0   Lunch: grilled chicken with salad   Snack: 0  Dinner: lean protein, vegetables and either rice or pasta or beans ( does not measure portion )  Snack: crackers or mixed nuts , white cheddar popcorn  Beverage intake: water, coffee/tea - has eliminated sugar sweetened beverages  Protein supplement: none currently   Estimated protein intake per day: 80-90 grams   Estimated fluid intake per day: 64 ounces or more per day   Meals eaten away from home: 2 per week   Typical meal pattern: 3 meals per day and 0-1 snacks per day  Eating Behaviors: Consumption of high calorie/ high fat foods, Large portion sizes, Craves sweet foods and stress eating   Food allergies or intolerances:    Allergies   Allergen Reactions    Other      Seasonal allergies     Cultural or Lutheran considerations:      Physical Assessment  Physical Activity  Types of exercise: Tracks steps 5000 to 6000 per day - he continues to increase his steps  Current physical limitations: none noted, short on breath     Psychosocial Assessment   Support systems: spouse sibling(s) friend(s)   Socioeconomic factors: patient now has a full time job out of the home     Nutrition Diagnosis( continued )   Diagnosis: Overweight / Obesity (NC-3 3)  Related to: Physical inactivity and Excessive energy intake  As Evidenced by: BMI>35     Nutrition Prescription: Recommend the following diet  Regular     Interventions and Teaching   Discussed pre-op and post-op nutrition guidelines  Patient educated and handouts provided  Adequate hydration  Exercise  Suggestions for pre-op diet: Since patient has a 6 month program doesn't want to start protein shakes at this time  Discussed keto diet but protein and fat level would be too high  Agreed upon a lower carbohydrate menu   Nutrition considerations after surgery  Protein supplements:    Appropriate carbohydrate, protein, and fat intake, and food/fluid choices to maximize safe weight loss, nutrient intake, and tolerance Provided with calories and macros:  1800  135 grams of protein and 135 grams of carb ( 30%)  80 grams of fat (40%)  Dietary and lifestyle changes Patient is following 30/60 rule   Possible problems with poor eating habits  Intuitive eating  Techniques for self monitoring and keeping daily food journal Reviewed use of the [employer] roland   Vitamin / Mineral supplementation of Multivitamin with minerals, Vitamin D and patient is currently not taking any vitamins or  Minerals, instructed to start a multivitamin and mineral plus vitamin D3   Patient is taking additional  2000 IU of vitamin D as was recommended at last appointment       Education provided to: patient    Barriers to learning: No barriers identified    Readiness to change: preparation    Comprehension: verbalized  understanding     Expected Compliance: good    Workflow:  Patient completed renal consult  Completed cardiology consult  Provided with support group flyer and plans to attend in March/spring  Evaluation / Monitoring  Dietitian to Monitor: Eating pattern as discussed Tolerance of nutrition prescription Body weight Lab values Physical activity  Patients admits to not being mindful about what he is eating  He has started tracking his steps 5000 to 6000 per day and has decreased his intake of red meats  He is practicing the 30/60 rule  He is taking 2000 IU of vitamin D as recommended at previous appointment  Goals  1  Food log 1800 calories  2    Track steps, increase to 8000 per day      Time Spent:   30 minutes

## 2019-02-19 ENCOUNTER — TELEPHONE (OUTPATIENT)
Dept: BARIATRICS | Facility: CLINIC | Age: 44
End: 2019-02-19

## 2019-02-19 NOTE — TELEPHONE ENCOUNTER
Left message instructing patient to adjust his goals in bariatastic to 1800 calories, 112 grams of protein, 180 grams of carb and 70 grams of fat  Also emailed patient information  Name and contact information left

## 2019-03-14 ENCOUNTER — HOSPITAL ENCOUNTER (OUTPATIENT)
Dept: NON INVASIVE DIAGNOSTICS | Facility: CLINIC | Age: 44
Discharge: HOME | End: 2019-03-14
Payer: COMMERCIAL

## 2019-03-14 ENCOUNTER — TELEPHONE (OUTPATIENT)
Dept: FAMILY MEDICINE CLINIC | Facility: CLINIC | Age: 44
End: 2019-03-14

## 2019-03-14 DIAGNOSIS — Z99.89 OSA ON CPAP: ICD-10-CM

## 2019-03-14 DIAGNOSIS — Z01.810 PREOPERATIVE CARDIOVASCULAR EXAMINATION: ICD-10-CM

## 2019-03-14 DIAGNOSIS — G47.33 OSA ON CPAP: ICD-10-CM

## 2019-03-14 PROCEDURE — 93306 TTE W/DOPPLER COMPLETE: CPT | Performed by: INTERNAL MEDICINE

## 2019-03-14 PROCEDURE — 93306 TTE W/DOPPLER COMPLETE: CPT

## 2019-03-14 NOTE — TELEPHONE ENCOUNTER
----- Message from Sonia Somers MD sent at 3/14/2019  1:44 PM EDT -----  Did we order echo or cards?

## 2019-03-15 ENCOUNTER — OFFICE VISIT (OUTPATIENT)
Dept: BARIATRICS | Facility: CLINIC | Age: 44
End: 2019-03-15

## 2019-03-15 VITALS — WEIGHT: 315 LBS | HEIGHT: 73 IN | BODY MASS INDEX: 41.75 KG/M2

## 2019-03-15 DIAGNOSIS — R79.89 ELEVATED SERUM CREATININE: Primary | ICD-10-CM

## 2019-03-15 DIAGNOSIS — E66.01 OBESITY, CLASS III, BMI 40-49.9 (MORBID OBESITY) (HCC): ICD-10-CM

## 2019-03-15 PROCEDURE — RECHECK: Performed by: DIETITIAN, REGISTERED

## 2019-03-15 NOTE — PROGRESS NOTES
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  Was grazing and snacking to stay awake  [facility] Equation:  BMR: 2648 kcal per day  Estimated calories for weight loss =4435-0092 kcal per day ( 1 to 2 pounds per week sedentary )   Estimated protein needs = 86-97 grams per day ( 8- 9 grams adjusted BW)  108 kg adjusted body weight with renal disease       Review of History and Medications   Past Medical History:   Diagnosis Date    Anxiety     Morbid obesity ([address] )     Sleep apnea     Snoring      Past Surgical History:   Procedure Laterality Date    NO PAST SURGERIES      [place] EGD TRANSORAL BIOPSY SINGLE/MULTIPLE N/A 5/16/2018    Procedure: ESOPHAGOGASTRODUODENOSCOPY (EGD) with bx;  Surgeon: Lorin Robledo MD;  Location: [facility];   Service: Bariatrics    WISDOM TOOTH EXTRACTION       Social History     Socioeconomic History    Marital status: /Civil Union     Spouse name: Not on file    Number of children: Not on file    Years of education: Not on file    Highest education level: Not on file   Occupational History    Not on file   Social Needs    Financial resource strain: Not on file    Food insecurity:     Worry: Not on file     Inability: Not on file    Transportation needs:     Medical: Not on file     Non-medical: Not on file   Tobacco Use    Smoking status: Never Smoker    Smokeless tobacco: Never Used   Substance and Sexual Activity    Alcohol use: Yes     Comment: rare social    Drug use: No    Sexual activity: Yes     Birth control/protection: Female Sterilization   Lifestyle    Physical activity:     Days per week: Not on file     Minutes per session: Not on file    Stress: Not on file   Relationships    Social connections:     Talks on phone: Not on file     Gets together: Not on file     Attends Denominational service: Not on file     Active member of club or organization: Not on file     Attends meetings of clubs or organizations: Not on file     Relationship status: Not on file    Intimate partner violence:     Fear of current or ex partner: Not on file     Emotionally abused: Not on file     Physically abused: Not on file     Forced sexual activity: Not on file   Other Topics Concern    Not on file   Social History Narrative    Lives with wife and 2 kids    Part time work from home     No current outpatient medications on file       Food Intake and Lifestyle Assessment   Food Intake Assessment completed via usual intake   Patient reports that he has significantly cut back on red meat- maybe once to twice per week  Incorporating more vegetarian options including beans due to proteinuria   Breakfast: eggs with toast, normally around 7 am   Snack: 0   Lunch: grilled chicken with salad   Snack: 0  Dinner: lean protein, vegetables and either rice or pasta or beans ( does not measure portion )  Snack: crackers or mixed nuts , white cheddar popcorn  Beverage intake: water, coffee/tea - has eliminated sugar sweetened beverages  Protein supplement: none currently   Estimated protein intake per day: 80-90 grams   Estimated fluid intake per day: 64 ounces or more per day   Meals eaten away from home: 2 per week   Typical meal pattern: 3 meals per day and 0-1 snacks per day  Eating Behaviors: Consumption of high calorie/ high fat foods, Large portion sizes, Craves sweet foods and stress eating   Food allergies or intolerances: Allergies   Allergen Reactions    Other      Seasonal allergies     Cultural or Taoist considerations:      Physical Assessment  Physical Activity  Types of exercise: Tracks steps 5000 to 6000 per day - he continues to increase his steps  Continues to average 6000 steps per day   Current physical limitations: none noted, short on breath     Psychosocial Assessment   Support systems: spouse sibling(s) friend(s)   Socioeconomic factors: patient now has a full time job out of the home   His wife is also in process to get weight loss surgery     Nutrition Diagnosis( continued )   Diagnosis: Overweight / Obesity (NC-3 3)  Related to: Physical inactivity and Excessive energy intake  As Evidenced by: BMI>35 - increased with 10 pounds weight gain     Nutrition Prescription: Recommend the following diet  Regular     Interventions and Teaching   Discussed pre-op and post-op nutrition guidelines  Patient educated and handouts provided  Adequate hydration  Exercise  Suggestions for pre-op diet: Discussed 3 meals and 3 small snacks as patient does better when he eats more frequently    Discussed limiting red meat and protein foods due to his elevated creatinine    Nutrition considerations after surgery  Protein supplements:    Appropriate carbohydrate, protein, and fat intake, and food/fluid choices to maximize safe weight loss, nutrient intake, and tolerance Provided with calories and macros:  1800  90 grams of protein  200 grams of carb  70 grams of fat   Dietary and lifestyle changes Patient is following 30/60 rule   Possible problems with poor eating habits  Intuitive eating  Techniques for self monitoring and keeping daily food journal Encouraged the  use of the [employer] roland   Vitamin / Mineral supplementation of Multivitamin with minerals, Vitamin D and patient is currently not taking any vitamins or  Minerals, instructed to start a multivitamin and mineral plus vitamin D3   Patient is taking additional  2000 IU of vitamin D as was recommended at last appointment  Education provided to: patient    Barriers to learning: No barriers identified    Readiness to change: preparation    Comprehension: verbalized  understanding     Expected Compliance: good    Workflow:  Patient needs to schedule renal follow up  Dr Dary Anna has recommended that he follow up in 6 months for further testing, as initial testing considered baseline  Completed cardiology consult  Provided with support group flyer and plans to attend in [address] [place] [address]  Evaluation / Monitoring  Dietitian to Monitor: Eating pattern as discussed Tolerance of nutrition prescription Body weight Lab values Physical activity  Patients admits to not being mindful about what he is eating  He continue to track his steps 5000 to 6000 per day and has decreased his intake of red meats  He is practicing the 30/60 rule  He is taking 2000 IU of vitamin D as recommended at previous appointment  He has not been keeping a food log     Goals  1  Food log 1800 calories- follow meal plan provided, 3 meals and 3 snacks  2  Track steps, increase to 8000 per day  3    Complete all 6 lesson plans in chapter 2 of bariatric book       Time Spent:   30 minutes

## 2019-04-15 ENCOUNTER — OFFICE VISIT (OUTPATIENT)
Dept: SLEEP CENTER | Facility: CLINIC | Age: 44
End: 2019-04-15
Payer: COMMERCIAL

## 2019-04-15 VITALS
SYSTOLIC BLOOD PRESSURE: 118 MMHG | HEIGHT: 73 IN | WEIGHT: 315 LBS | HEART RATE: 68 BPM | DIASTOLIC BLOOD PRESSURE: 76 MMHG | BODY MASS INDEX: 41.75 KG/M2

## 2019-04-15 DIAGNOSIS — Z99.89 OBSTRUCTIVE SLEEP APNEA TREATED WITH CONTINUOUS POSITIVE AIRWAY PRESSURE (CPAP): Primary | ICD-10-CM

## 2019-04-15 DIAGNOSIS — G47.33 OBSTRUCTIVE SLEEP APNEA TREATED WITH CONTINUOUS POSITIVE AIRWAY PRESSURE (CPAP): Primary | ICD-10-CM

## 2019-04-15 PROCEDURE — 99213 OFFICE O/P EST LOW 20 MIN: CPT | Performed by: NURSE PRACTITIONER

## 2019-04-25 ENCOUNTER — OFFICE VISIT (OUTPATIENT)
Dept: BARIATRICS | Facility: CLINIC | Age: 44
End: 2019-04-25
Payer: COMMERCIAL

## 2019-04-25 VITALS
WEIGHT: 315 LBS | BODY MASS INDEX: 41.75 KG/M2 | DIASTOLIC BLOOD PRESSURE: 94 MMHG | SYSTOLIC BLOOD PRESSURE: 132 MMHG | HEIGHT: 73 IN | RESPIRATION RATE: 16 BRPM | TEMPERATURE: 97.8 F | HEART RATE: 97 BPM

## 2019-04-25 DIAGNOSIS — Z99.89 OBSTRUCTIVE SLEEP APNEA TREATED WITH CONTINUOUS POSITIVE AIRWAY PRESSURE (CPAP): ICD-10-CM

## 2019-04-25 DIAGNOSIS — E66.01 MORBID (SEVERE) OBESITY DUE TO EXCESS CALORIES (HCC): Primary | ICD-10-CM

## 2019-04-25 DIAGNOSIS — R03.0 PREHYPERTENSION: ICD-10-CM

## 2019-04-25 DIAGNOSIS — G47.33 OBSTRUCTIVE SLEEP APNEA TREATED WITH CONTINUOUS POSITIVE AIRWAY PRESSURE (CPAP): ICD-10-CM

## 2019-04-25 PROCEDURE — 99214 OFFICE O/P EST MOD 30 MIN: CPT | Performed by: FAMILY MEDICINE

## 2019-05-24 ENCOUNTER — OFFICE VISIT (OUTPATIENT)
Dept: BARIATRICS | Facility: CLINIC | Age: 44
End: 2019-05-24

## 2019-05-24 VITALS — BODY MASS INDEX: 41.75 KG/M2 | WEIGHT: 315 LBS | HEIGHT: 73 IN

## 2019-05-24 DIAGNOSIS — E66.01 OBESITY, MORBID, BMI 50 OR HIGHER (HCC): Primary | ICD-10-CM

## 2019-05-24 PROCEDURE — RECHECK: Performed by: DIETITIAN, REGISTERED

## 2019-06-07 ENCOUNTER — OFFICE VISIT (OUTPATIENT)
Dept: CARDIOLOGY CLINIC | Facility: CLINIC | Age: 44
End: 2019-06-07
Payer: COMMERCIAL

## 2019-06-07 VITALS
WEIGHT: 315 LBS | OXYGEN SATURATION: 97 % | DIASTOLIC BLOOD PRESSURE: 84 MMHG | HEART RATE: 80 BPM | BODY MASS INDEX: 41.75 KG/M2 | SYSTOLIC BLOOD PRESSURE: 120 MMHG | HEIGHT: 73 IN

## 2019-06-07 DIAGNOSIS — R03.0 PREHYPERTENSION: ICD-10-CM

## 2019-06-07 DIAGNOSIS — Z01.810 PREOPERATIVE CARDIOVASCULAR EXAMINATION: Primary | ICD-10-CM

## 2019-06-07 PROCEDURE — 99213 OFFICE O/P EST LOW 20 MIN: CPT | Performed by: INTERNAL MEDICINE

## 2019-06-21 ENCOUNTER — TELEPHONE (OUTPATIENT)
Dept: BARIATRICS | Facility: CLINIC | Age: 44
End: 2019-06-21

## 2019-06-27 ENCOUNTER — OFFICE VISIT (OUTPATIENT)
Dept: BARIATRICS | Facility: CLINIC | Age: 44
End: 2019-06-27

## 2019-06-27 VITALS — BODY MASS INDEX: 41.75 KG/M2 | WEIGHT: 315 LBS | HEIGHT: 73 IN

## 2019-06-27 DIAGNOSIS — E66.01 MORBID (SEVERE) OBESITY DUE TO EXCESS CALORIES (HCC): ICD-10-CM

## 2019-06-27 DIAGNOSIS — Z01.812 PRE-PROCEDURE LAB EXAM: Primary | ICD-10-CM

## 2019-06-27 PROCEDURE — RECHECK: Performed by: DIETITIAN, REGISTERED

## 2019-07-12 ENCOUNTER — APPOINTMENT (OUTPATIENT)
Dept: LAB | Facility: CLINIC | Age: 44
End: 2019-07-12
Payer: COMMERCIAL

## 2019-07-12 DIAGNOSIS — R80.1 PERSISTENT PROTEINURIA: ICD-10-CM

## 2019-07-12 DIAGNOSIS — Z01.812 PRE-PROCEDURE LAB EXAM: ICD-10-CM

## 2019-07-12 DIAGNOSIS — R79.89 ELEVATED SERUM CREATININE: ICD-10-CM

## 2019-07-12 DIAGNOSIS — E66.01 OBESITY, CLASS III, BMI 40-49.9 (MORBID OBESITY) (HCC): ICD-10-CM

## 2019-07-12 DIAGNOSIS — R03.0 PREHYPERTENSION: ICD-10-CM

## 2019-07-12 LAB
ALBUMIN SERPL BCP-MCNC: 3.6 G/DL (ref 3.5–5)
ALP SERPL-CCNC: 88 U/L (ref 46–116)
ALT SERPL W P-5'-P-CCNC: 48 U/L (ref 12–78)
ANION GAP SERPL CALCULATED.3IONS-SCNC: 6 MMOL/L (ref 4–13)
AST SERPL W P-5'-P-CCNC: 24 U/L (ref 5–45)
BILIRUB SERPL-MCNC: 0.86 MG/DL (ref 0.2–1)
BUN SERPL-MCNC: 14 MG/DL (ref 5–25)
CALCIUM SERPL-MCNC: 8.7 MG/DL (ref 8.3–10.1)
CHLORIDE SERPL-SCNC: 109 MMOL/L (ref 100–108)
CHOLEST SERPL-MCNC: 109 MG/DL (ref 50–200)
CO2 SERPL-SCNC: 28 MMOL/L (ref 21–32)
CREAT SERPL-MCNC: 1.26 MG/DL (ref 0.6–1.3)
CREAT UR-MCNC: 320 MG/DL
ERYTHROCYTE [DISTWIDTH] IN BLOOD BY AUTOMATED COUNT: 13.9 % (ref 11.6–15.1)
GFR SERPL CREATININE-BSD FRML MDRD: 69 ML/MIN/1.73SQ M
GLUCOSE P FAST SERPL-MCNC: 102 MG/DL (ref 65–99)
HCT VFR BLD AUTO: 40.2 % (ref 36.5–49.3)
HDLC SERPL-MCNC: 33 MG/DL (ref 40–60)
HGB BLD-MCNC: 14 G/DL (ref 12–17)
LDLC SERPL CALC-MCNC: 60 MG/DL (ref 0–100)
MCH RBC QN AUTO: 27.5 PG (ref 26.8–34.3)
MCHC RBC AUTO-ENTMCNC: 34.8 G/DL (ref 31.4–37.4)
MCV RBC AUTO: 79 FL (ref 82–98)
MICROALBUMIN UR-MCNC: 10.1 MG/L (ref 0–20)
MICROALBUMIN/CREAT 24H UR: 3 MG/G CREATININE (ref 0–30)
NONHDLC SERPL-MCNC: 76 MG/DL
PHOSPHATE SERPL-MCNC: 3.4 MG/DL (ref 2.7–4.5)
PLATELET # BLD AUTO: 233 THOUSANDS/UL (ref 149–390)
PMV BLD AUTO: 10.7 FL (ref 8.9–12.7)
POTASSIUM SERPL-SCNC: 3.9 MMOL/L (ref 3.5–5.3)
PROT SERPL-MCNC: 6.9 G/DL (ref 6.4–8.2)
RBC # BLD AUTO: 5.09 MILLION/UL (ref 3.88–5.62)
SODIUM SERPL-SCNC: 143 MMOL/L (ref 136–145)
TRIGL SERPL-MCNC: 82 MG/DL
TSH SERPL DL<=0.05 MIU/L-ACNC: 2.03 UIU/ML (ref 0.36–3.74)
WBC # BLD AUTO: 5.76 THOUSAND/UL (ref 4.31–10.16)

## 2019-07-12 PROCEDURE — 80053 COMPREHEN METABOLIC PANEL: CPT

## 2019-07-12 PROCEDURE — 84100 ASSAY OF PHOSPHORUS: CPT

## 2019-07-12 PROCEDURE — 85027 COMPLETE CBC AUTOMATED: CPT

## 2019-07-12 PROCEDURE — 80061 LIPID PANEL: CPT

## 2019-07-12 PROCEDURE — 36415 COLL VENOUS BLD VENIPUNCTURE: CPT

## 2019-07-12 PROCEDURE — 82043 UR ALBUMIN QUANTITATIVE: CPT

## 2019-07-12 PROCEDURE — 82570 ASSAY OF URINE CREATININE: CPT

## 2019-07-12 PROCEDURE — 84443 ASSAY THYROID STIM HORMONE: CPT

## 2019-07-26 ENCOUNTER — OFFICE VISIT (OUTPATIENT)
Dept: BARIATRICS | Facility: CLINIC | Age: 44
End: 2019-07-26

## 2019-07-26 VITALS — BODY MASS INDEX: 41.75 KG/M2 | WEIGHT: 315 LBS | HEIGHT: 73 IN

## 2019-07-26 DIAGNOSIS — E66.01 OBESITY, CLASS III, BMI 40-49.9 (MORBID OBESITY) (HCC): Primary | ICD-10-CM

## 2019-07-26 PROCEDURE — RECHECK: Performed by: DIETITIAN, REGISTERED

## 2019-07-26 NOTE — PROGRESS NOTES
Bariatric Nutrition Assessment Note  Weight check 5 of 6    Type of surgery    Preop  Surgery Date: Insurance changed January 2019- patient now has 6 month program requirement   Feb- July  Surgeon : Dr Earl Pagan  Today is 6 of 6 month program requirement     Nutrition Assessment   Ta Mckeon  40 y o   male     Wt with BMI of 25: 188 6 lb   Pre-Op Excess Wt: 184 9 lb  Ht 6' 1" (1 854 m)   Wt (!) 171 kg (377 lb 8 oz)   BMI 49 81 kg/m²       5# wt loss past month   Overall 4 0 lbs weight gain since starting the program- patient aware to continue to focus on weight loss     [facility] Equation:  BMR: 2648 kcal per day  Estimated calories for weight loss =6978-2293 kcal per day ( 1 to 2 pounds per week sedentary )   Estimated protein needs = 86-97 grams per day ( 8- 9 grams adjusted BW)  108 kg adjusted body weight with renal disease       Review of History and Medications   Past Medical History:   Diagnosis Date    Anxiety     Morbid obesity ([address] )     Sleep apnea     Snoring      Past Surgical History:   Procedure Laterality Date    NO PAST SURGERIES      [place] EGD TRANSORAL BIOPSY SINGLE/MULTIPLE N/A 5/16/2018    Procedure: ESOPHAGOGASTRODUODENOSCOPY (EGD) with bx;  Surgeon: Brookie Holstein, MD;  Location: [facility];   Service: Bariatrics    WISDOM TOOTH EXTRACTION       Social History     Socioeconomic History    Marital status: /Civil Union     Spouse name: Not on file    Number of children: Not on file    Years of education: Not on file    Highest education level: Not on file   Occupational History    Not on file   Social Needs    Financial resource strain: Not on file    Food insecurity:     Worry: Not on file     Inability: Not on file    Transportation needs:     Medical: Not on file     Non-medical: Not on file   Tobacco Use    Smoking status: Never Smoker    Smokeless tobacco: Never Used   Substance and Sexual Activity    Alcohol use: Yes     Comment: rare social    Drug use: No    Sexual activity: Yes     Birth control/protection: Female Sterilization   Lifestyle    Physical activity:     Days per week: Not on file     Minutes per session: Not on file    Stress: Not on file   Relationships    Social connections:     Talks on phone: Not on file     Gets together: Not on file     Attends Yazidi service: Not on file     Active member of club or organization: Not on file     Attends meetings of clubs or organizations: Not on file     Relationship status: Not on file    Intimate partner violence:     Fear of current or ex partner: Not on file     Emotionally abused: Not on file     Physically abused: Not on file     Forced sexual activity: Not on file   Other Topics Concern    Not on file   Social History Narrative    Lives with wife and 2 kids    Part time work from home     No current outpatient medications on file  Taking MVI daily, vitamin D inconsistently    Food Intake and Lifestyle Assessment   Food Intake Assessment completed via usual intake- decreased snacking (sweets) and has decreased portions  Breakfast: eggs with toast, normally around 7 am   Snack: 0   Lunch: potato or starch, steak or chicken  Snack: 0  Dinner: potato or starch, steak or chicken  Snack: Yogurt instead of ice cream   Beverage intake: water, coffee/tea - has eliminated sugar sweetened beverages  Protein supplement: none currently   Estimated protein intake per day: 80-90 grams   Estimated fluid intake per day: 64 ounces or more per day   Meals eaten away from home: 2 per week   Typical meal pattern: 3 meals per day and 0-1 snacks per day  Eating Behaviors: Consumption of high calorie/ high fat foods, Large portion sizes, Craves sweet foods and stress eating   Food allergies or intolerances:    Allergies   Allergen Reactions    Other      Seasonal allergies     Cultural or Yazidi considerations:      Physical Assessment  Physical Activity  Types of exercise: Tracks steps 6000 to 7000 per day (walking with kids now that they are home from school for the summer)  Current physical limitations: none noted, short on breath     Psychosocial Assessment   Support systems: spouse sibling(s) friend(s)   Socioeconomic factors: patient now has a full time job out of the home   His wife is also in process to get weight loss surgery , had initial appointment     Nutrition Diagnosis( continued )   Diagnosis: Overweight / Obesity (NC-3 3)  Related to: Physical inactivity and Excessive energy intake  As Evidenced by: BMI>35 - improved with 5 pound weight loss     Nutrition Prescription: Recommend the following diet  Regular     Interventions and Teaching   Discussed pre-op and post-op nutrition guidelines  Patient educated and handouts provided  -Protein snacks list provided  Adequate hydration  Exercise  Suggestions for pre-op diet:    Nutrition considerations after surgery  Protein supplements:    Appropriate carbohydrate, protein, and fat intake, and food/fluid choices to maximize safe weight loss, nutrient intake, and toleranceDietary and lifestyle changes Patient is following 30/60 rule and taking 15-20 minutes to eat   Possible problems with poor eating habits  Intuitive eating  Techniques for self monitoring and keeping daily food journal Encouraged the  use of the [employer] roland but patient finds he does not have the time or forgets to document     Vitamin / Mineral supplementation of Multivitamin with minerals, Vitamin D and patient is currently not taking any vitamins or  Minerals, instructed to start a multivitamin and mineral plus vitamin D3   Patient is taking additional  2000 IU of vitamin D as recommended      Education provided to: patient    Barriers to learning: No barriers identified    Readiness to change: Action     Comprehension: verbalized  understanding     Expected Compliance: good    Workflow:    - renal f/u 8/14/2019     Evaluation / Monitoring  Dietitian to Monitor: Eating pattern as discussed Tolerance of nutrition prescription Body weight Lab values Physical activity  Patient has decreased his portions and decreased snacking to one per day after dinner  He has substituted yogurt for ice cream   He is tracking his steps and reaching 8000 or more per day  He is following 30/60 and taking 15-20 minutes to eat his meals  Did not bring book to today's appointment  Goals  1  Continue with decreased portions, 3 meals and one snack of yogurt per day   2  Continue to increase activity- goal is increase average to 8,000 steps per day  3  Appointment  with Dr Shanae Romero  4   Complete all 6 lesson plans in book         Time Spent:   30 minutes

## 2019-08-14 ENCOUNTER — OFFICE VISIT (OUTPATIENT)
Dept: NEPHROLOGY | Facility: CLINIC | Age: 44
End: 2019-08-14
Payer: COMMERCIAL

## 2019-08-14 VITALS
WEIGHT: 315 LBS | RESPIRATION RATE: 22 BRPM | DIASTOLIC BLOOD PRESSURE: 84 MMHG | BODY MASS INDEX: 40.43 KG/M2 | HEIGHT: 74 IN | HEART RATE: 82 BPM | SYSTOLIC BLOOD PRESSURE: 122 MMHG

## 2019-08-14 DIAGNOSIS — G47.33 OBSTRUCTIVE SLEEP APNEA TREATED WITH CONTINUOUS POSITIVE AIRWAY PRESSURE (CPAP): ICD-10-CM

## 2019-08-14 DIAGNOSIS — N18.2 CKD (CHRONIC KIDNEY DISEASE), STAGE II: Primary | ICD-10-CM

## 2019-08-14 DIAGNOSIS — R03.0 PREHYPERTENSION: ICD-10-CM

## 2019-08-14 DIAGNOSIS — E66.01 MORBID (SEVERE) OBESITY DUE TO EXCESS CALORIES (HCC): ICD-10-CM

## 2019-08-14 DIAGNOSIS — Z99.89 OBSTRUCTIVE SLEEP APNEA TREATED WITH CONTINUOUS POSITIVE AIRWAY PRESSURE (CPAP): ICD-10-CM

## 2019-08-14 PROCEDURE — 99214 OFFICE O/P EST MOD 30 MIN: CPT | Performed by: NURSE PRACTITIONER

## 2019-08-14 NOTE — PROGRESS NOTES
OFFICE FOLLOW UP - Nephrology   Ta Mckeon 40 y o  male MRN: [id number]       ASSESSMENT and PLAN:  Diagnoses and all orders for this visit:    CKD (chronic kidney disease), stage II    Prehypertension    Morbid (severe) obesity due to excess calories ([address] )    Obstructive sleep apnea treated with continuous positive airway pressure (CPAP)        CKD stage II:  Previously follows with Dr Mar Sanches  Unknown baseline creatinine  Creatinine was noted to be 1 3 in August of 2018  Repeat creatinine in July this year was 1 2 with a GFR 69  Chronic disease in the setting of obesity, plus or minus component of hypertension  He did have protein noted on his previous dipstick and was requested to have microalbumin to creatinine ratio  It was also suggested that he obtain a renal ultrasound   -his urine micro albumin to creatinine ratio was 3 milligrams/gram   -renal ultrasound was not obtain  Will discuss today during visit  -okay to resume with bariatric surgery if renal function remains stable  - continue to avoid nephrotoxins  Pre hypertension:  Following outpatient with Cardiology, Dr Hans Ledesma  Recommended diet modification with low-dose sodium diet as well as exercise  Ejection fraction of 60%  TSH was noted to be normal   -currently not on antihypertensive   -continue low-sodium diet and exercise   -consider adding Ace inhibitor/Arb in the future if needed  Obesity:  Plan for future bariatric surgery  Okay from Nephrology standpoint pursue with surgery as long as creatinine remains stable   -continued weight loss  Obstructive sleep apnea:  Continues to wear CPAP at night  Patient will follow up in 6 months with Dr Mar Sanches or sooner if needed  HPI: Lilibeth Golden is a 40 y o  male with past medical history of CKD 2, pre hypertension, obesity, and sleep apnea who is here for routine follow-up for elevated creatinine  The patient previously was seen by Dr Mar Sanches in September of 2018   He is currently being evaluated for bariatric surgery  His surgery was postponed due to insurance change  He has been cleared by Cardiology  During his last visit it was recommended that he obtain a renal ultrasound  It does not appear that this has been completed  He did however obtain repeat blood work and urinalysis for microalbumin/creatinine ratio  He denies recent illness or hospitalizations  He denies chest pain or shortness of breath  He denies nausea, vomiting, diarrhea, or issues with urination  He is eating and drinking okay  He denies the use of NSAIDs at home  He denies any previous renal history  He is currently adjusting his diet for weight loss and to help assist with his blood pressure  Denies taking medication besides a multivitamin  ROS:   A complete review of systems was done  Pertinent positives and negatives as noted in the HPI, otherwise the review of systems is negative  Allergies: Other    Medications: No current outpatient medications on file  Past Medical History:   Diagnosis Date    Anxiety     Morbid obesity ([facility] [address] )     Sleep apnea     Snoring      Past Surgical History:   Procedure Laterality Date    NO PAST SURGERIES      [place] EGD TRANSORAL BIOPSY SINGLE/MULTIPLE N/A 5/16/2018    Procedure: ESOPHAGOGASTRODUODENOSCOPY (EGD) with bx;  Surgeon: Afia Wilson MD;  Location: [facility]; Service: Bariatrics    WISDOM TOOTH EXTRACTION       Family History   Problem Relation Age of Onset   Miles Cancer Mother         leukemia    Dementia Father       reports that he has never smoked  He has never used smokeless tobacco  He reports that he drinks alcohol  He reports that he does not use drugs  Physical Exam:   There were no vitals filed for this visit  There is no height or weight on file to calculate BMI      General: no acute distress   Eyes: conjunctivae pink, anicteric sclerae  ENT: mucous membranes moist  Neck: supple, no JVD  Chest: clear to auscultation bilaterally with no wheezes, rale or rhochi  CVS: regular rate and rhythm   Abdomen: soft, non-tender, non-distended, obese  Extremities: no lower extremity edema   Skin: no rash  Neuro: awake and alert       Lab Results:  Results for orders placed or performed in visit on 07/12/19   CBC   Result Value Ref Range    WBC 5 76 4 31 - 10 16 Thousand/uL    RBC 5 09 3 88 - 5 62 Million/uL    Hemoglobin 14 0 12 0 - 17 0 g/dL    Hematocrit 40 2 36 5 - 49 3 %    MCV 79 (L) 82 - 98 fL    MCH 27 5 26 8 - 34 3 pg    MCHC 34 8 31 4 - 37 4 g/dL    RDW 13 9 11 6 - 15 1 %    Platelets 925 053 - 219 Thousands/uL    MPV 10 7 8 9 - 12 7 fL   Microalbumin / creatinine urine ratio   Result Value Ref Range    Creatinine, Ur 320 0 mg/dL    Microalbum  ,U,Random 10 1 0 0 - 20 0 mg/L    Microalb Creat Ratio 3 0 - 30 mg/g creatinine   Comprehensive metabolic panel   Result Value Ref Range    Sodium 143 136 - 145 mmol/L    Potassium 3 9 3 5 - 5 3 mmol/L    Chloride 109 (H) 100 - 108 mmol/L    CO2 28 21 - 32 mmol/L    ANION GAP 6 4 - 13 mmol/L    BUN 14 5 - 25 mg/dL    Creatinine 1 26 0 60 - 1 30 mg/dL    Glucose, Fasting 102 (H) 65 - 99 mg/dL    Calcium 8 7 8 3 - 10 1 mg/dL    AST 24 5 - 45 U/L    ALT 48 12 - 78 U/L    Alkaline Phosphatase 88 46 - 116 U/L    Total Protein 6 9 6 4 - 8 2 g/dL    Albumin 3 6 3 5 - 5 0 g/dL    Total Bilirubin 0 86 0 20 - 1 00 mg/dL    eGFR 69 ml/min/1 73sq m   Lipid panel   Result Value Ref Range    Cholesterol 109 50 - 200 mg/dL    Triglycerides 82 <=150 mg/dL    HDL, Direct 33 (L) 40 - 60 mg/dL    LDL Calculated 60 0 - 100 mg/dL    Non-HDL-Chol (CHOL-HDL) 76 mg/dl   TSH, 3rd generation with Free T4 reflex   Result Value Ref Range    TSH 3RD GENERATON 2 030 0 358 - 3 740 uIU/mL   Phosphorus   Result Value Ref Range    Phosphorus 3 4 2 7 - 4 5 mg/dL             Invalid input(s): ALBUMIN      Portions of the record may have been created with voice recognition software   Occasional wrong word or "sound a like" substitutions may have occurred due to the inherent limitations of voice recognition software  Read the chart carefully and recognize, using context, where substitutions have occurred  If you have any questions, please contact the dictating provider

## 2019-08-14 NOTE — PATIENT INSTRUCTIONS
We discussed that you of chronic kidney disease stage 2  Your most recent creatinine was very stable and within your baseline  Please continue to avoid Motrin containing products such as ibuprofen, Advil, Excedrin, or Aleve  Your stable for surgery from a kidney standpoint unless for some reason her creatinine was to worsen  Your blood pressure is very good in the office today  Please continue your diet modifications and exercise  We will see you in the office in 6 months or sooner if needed  We will repeat blood work and urine test prior to this appointment  I provided with script for renal ultrasound for you to complete at your convenience  Chronic Kidney Disease   WHAT YOU SHOULD KNOW:   Chronic kidney disease (CKD) is the gradual and permanent loss of kidney function  It is also called chronic kidney failure, or chronic renal insufficiency  Normally, the kidneys remove fluid, chemicals, and waste from your blood  These wastes are turned into urine by your kidneys  When you have CKD, your kidneys do not function properly  CKD may worsen over time and lead to kidney failure  CARE AGREEMENT:   You have the right to help plan your care  Learn about your health condition and how it may be treated  Discuss treatment options with your caregivers to decide what care you want to receive  You always have the right to refuse treatment  RISKS:   CKD may increase your risk for bleeding and infections  It may cause anemia (low number of red blood cells) or problems with your bones  Treatments for CKD may cause unpleasant side effects  Without treatment, CKD can become life-threatening  WHILE YOU ARE HERE:   Informed consent is a legal document that explains the tests, treatments, or procedures that you may need  Informed consent means you understand what will be done and can make decisions about what you want  You give your permission when you sign the consent form   You can have someone sign this form for you if you are not able to sign it  You have the right to understand your medical care in words you know  Before you sign the consent form, understand the risks and benefits of what will be done  Make sure all your questions are answered  Nutrition: A healthcare provider or dietitian may tell you to eat foods low in sodium (salt), potassium, phosphorus, or protein  Intake and output: Healthcare providers will keep track of the amount of liquid you are receiving each day  They also may need to know how much you are urinating  Ask how much liquid you should drink each day  Weight: You may be weighed each day  Healthcare providers will compare your weight from day to day to see how much fluid you are gaining or losing  Medicines:  · Blood pressure medicine is given to lower your blood pressure and protect your kidneys  · Diuretics are given to decrease excess fluid and lower blood pressure  You may urinate more often when you take this medicine  · Erythropoietin is given to treat or prevent anemia  Your kidneys may not make enough erythropoietin, a chemical that helps your body make red blood cells  Tests:  · Blood and urine tests show how well your kidneys are working  They may also help manage or show the cause of your CKD  · An ultrasound, CT, or MRI may show the cause of CKD  You may be given contrast dye to help your kidneys show up better in the pictures  Tell the healthcare provider if you have ever had an allergic reaction to contrast dye  Do not enter the MRI room with anything metal  Metal can cause serious injury  Tell the healthcare provider if you have any metal in or on your body  · A biopsy is a procedure to remove a small piece of tissue from your kidney  It is done to find the cause of your CKD  Treatment:  · Dialysis is a treatment to remove chemicals and waste from your blood when kidneys can no longer do this       · Surgery may be needed to create an arteriovenous fistula (AVF) in your arm or insert a catheter into your abdomen  This is done so you can receive dialysis  · A kidney transplant may be done if your CKD becomes severe  © 2014 [address] is for End User's use only and may not be sold, redistributed or otherwise used for commercial purposes  All illustrations and images included in CareNotes® are the copyrighted property of A D A M , Inc  or George [employer]  The above information is an  only  It is not intended as medical advice for individual conditions or treatments  Talk to your doctor, nurse or pharmacist before following any medical regimen to see if it is safe and effective for you

## 2019-10-09 ENCOUNTER — OFFICE VISIT (OUTPATIENT)
Dept: BARIATRICS | Facility: CLINIC | Age: 44
End: 2019-10-09

## 2019-10-09 VITALS — WEIGHT: 315 LBS | BODY MASS INDEX: 48.21 KG/M2

## 2019-10-09 DIAGNOSIS — E66.01 MORBID (SEVERE) OBESITY DUE TO EXCESS CALORIES (HCC): Primary | ICD-10-CM

## 2019-10-09 PROBLEM — G47.30 SLEEP APNEA: Status: ACTIVE | Noted: 2019-10-09

## 2019-10-09 PROCEDURE — RECHECK: Performed by: DIETITIAN, REGISTERED

## 2019-10-09 NOTE — PROGRESS NOTES
Bariatric Nutrition Follow-up Note    Type of surgery    Preop  Surgery Date: Insurance changed January 2019- patient now has 6 month program requirement: Completed in July  Surgeon : Dr Iza Logan  40 y o   male     [place] with BMI of 25: 188 6 lb   Pre-Op Excess Wt: 184 9 lb  Wt (!) 170 kg (375 lb 8 oz)   BMI 48 21 kg/m²       2# wt loss since last office visit 7/26/19  Overall 2 lbs weight gain since starting the program- patient aware to continue to focus on weight loss     Connie Sibley Equation:  BMR: 2648 kcal per day  Estimated calories for weight loss =9456-4668 kcal per day (1 to 2 pounds per week sedentary)   Estimated protein needs = 86-97 grams per day ( 8- 9 grams adjusted BW)  108 kg adjusted body weight with renal disease       Review of History and Medications   Past Medical History:   Diagnosis Date    Anxiety     Morbid obesity ([address] )     Sleep apnea     Snoring      Past Surgical History:   Procedure Laterality Date    NO PAST SURGERIES      [place] EGD TRANSORAL BIOPSY SINGLE/MULTIPLE N/A 5/16/2018    Procedure: ESOPHAGOGASTRODUODENOSCOPY (EGD) with bx;  Surgeon: Beatriz Clancy MD;  Location: [facility];   Service: Bariatrics    WISDOM TOOTH EXTRACTION       Social History     Socioeconomic History    Marital status: /Civil Union     Spouse name: Not on file    Number of children: Not on file    Years of education: Not on file    Highest education level: Not on file   Occupational History    Not on file   Social Needs    Financial resource strain: Not on file    Food insecurity:     Worry: Not on file     Inability: Not on file    Transportation needs:     Medical: Not on file     Non-medical: Not on file   Tobacco Use    Smoking status: Never Smoker    Smokeless tobacco: Never Used   Substance and Sexual Activity    Alcohol use: Yes     Comment: rare social    Drug use: No    Sexual activity: Yes     Birth control/protection: Female Sterilization   Lifestyle    Physical activity:     Days per week: Not on file     Minutes per session: Not on file    Stress: Not on file   Relationships    Social connections:     Talks on phone: Not on file     Gets together: Not on file     Attends Worship service: Not on file     Active member of club or organization: Not on file     Attends meetings of clubs or organizations: Not on file     Relationship status: Not on file    Intimate partner violence:     Fear of current or ex partner: Not on file     Emotionally abused: Not on file     Physically abused: Not on file     Forced sexual activity: Not on file   Other Topics Concern    Not on file   Social History Narrative    Lives with wife and 2 kids    Part time work from home     No current outpatient medications on file  Taking MVI daily, vitamin D inconsistently    Food Intake and Lifestyle Assessment   Food Intake Assessment completed via usual intake- decreased snacking (sweets) and has decreased portions  Breakfast: eggs with toast, normally around 7 am   Snack: 0   Lunch: potato or starch, steak or chicken  Snack: 0  Dinner: potato or starch, steak or chicken  Snack: Yogurt instead of ice cream   Beverage intake: water, coffee/tea - has eliminated sugar sweetened beverages  Protein supplement: none currently   Estimated protein intake per day: 80-90 grams   Estimated fluid intake per day: 64 ounces or more per day   Meals eaten away from home: 2 per week   Typical meal pattern: 3 meals per day and 0-1 snacks per day  Eating Behaviors: Consumption of high calorie/ high fat foods, Large portion sizes, Craves sweet foods and stress eating   Food allergies or intolerances: Allergies   Allergen Reactions    Other      Seasonal allergies     Cultural or Worship considerations:      Physical Assessment  Physical Activity  Types of exercise: Tracks steps 6000 to 7000 per day    Pt reports his job is sedentary and his biggest barrier to exercising outside of work is time  Pt is not currently exercising  Current physical limitations: none noted, short on breath     Psychosocial Assessment   Support systems: spouse sibling(s) friend(s)   Socioeconomic factors: patient now has a full time job out of the home   His wife is also in process to get weight loss surgery , had initial appointment     Nutrition Diagnosis (continued)   Diagnosis: Overweight / Obesity (NC-3 3)  Related to: Physical inactivity and Excessive energy intake  As Evidenced by: BMI>35 - improved with 5 pound weight loss     Nutrition Prescription: Recommend the following diet  Regular  Calculated macros based on 2200 kcal/day weight loss goal from above:  50% CHO= 275g/day  30% fats= 73g/day  20% Pro=110 g/day    Interventions and Teaching   Discussed pre-op and post-op nutrition guidelines  Patient educated and handouts provided  -Protein snacks list provided  Adequate hydration  Exercise:  Discussed barriers to exercise and importance of developing habits prior to surgery to maximize health and results after surgery  Pt decided during his lunch break would be the most opportune time to walk  Goal to walk on his lunch break from work  Recommended [employer] smartphone roland to track walking  Suggestions for pre-op diet:    Nutrition considerations after surgery  Protein supplements:    Appropriate carbohydrate, protein, and fat intake, and food/fluid choices to maximize safe weight loss, nutrient intake, and tolerance  Dietary and lifestyle changes Patient is following 30/60 rule and taking 15-20 minutes to eat   Possible problems with poor eating habits  Intuitive eating  Techniques for self monitoring and keeping daily food journal Encouraged the  use of the Intellistream roland but patient finds he does not have the time or forgets to document  Again discussed importance of food logging    Pt's goal is to complete food logs with above macronutrient goals at least four days per week to review at next appointment  Vitamin / Mineral supplementation of Multivitamin with minerals  Patient is taking additional  2000 IU of vitamin D as recommended      Education provided to: patient    Barriers to learning: No barriers identified    Readiness to change: Action     Comprehension: verbalized  understanding     Expected Compliance: good    Evaluation / Monitoring  Dietitian to Monitor: Eating pattern as discussed Tolerance of nutrition prescription Body weight Lab values Physical activity  Patient has decreased his portions and decreased snacking to one per day after dinner  He has substituted yogurt for ice cream   He is tracking his steps and reaching 8000 or more per day  He is following 30/60 and taking 15-20 minutes to eat his meals  Did not bring book to today's appointment  Goals  1  Continue with decreased portions, 3 meals and one snack of yogurt per day: Continued  2  Continue to increase activity- goal is increase average to 8,000 steps per day: Still tracking steps but no structured exercise  Goal is to walk on his lunch break and use tracking roland  3  Complete all 6 lesson plans in book: Did not bring book  Goal to bring manual to next session  4  Food journal with [employer] roland at least 4 days per week  Time Spent:   30 minutes  Pt agreed to meet every two weeks for support  Follow-up scheduled for 10/24/19

## 2019-10-17 ENCOUNTER — OFFICE VISIT (OUTPATIENT)
Dept: BARIATRICS | Facility: CLINIC | Age: 44
End: 2019-10-17
Payer: COMMERCIAL

## 2019-10-17 VITALS
HEART RATE: 63 BPM | SYSTOLIC BLOOD PRESSURE: 130 MMHG | DIASTOLIC BLOOD PRESSURE: 80 MMHG | WEIGHT: 315 LBS | HEIGHT: 73 IN | TEMPERATURE: 97.4 F | BODY MASS INDEX: 41.75 KG/M2

## 2019-10-17 DIAGNOSIS — R79.89 ELEVATED SERUM CREATININE: ICD-10-CM

## 2019-10-17 DIAGNOSIS — G47.33 OBSTRUCTIVE SLEEP APNEA TREATED WITH CONTINUOUS POSITIVE AIRWAY PRESSURE (CPAP): ICD-10-CM

## 2019-10-17 DIAGNOSIS — E66.01 OBESITY, CLASS III, BMI 40-49.9 (MORBID OBESITY) (HCC): Primary | ICD-10-CM

## 2019-10-17 DIAGNOSIS — R03.0 PREHYPERTENSION: ICD-10-CM

## 2019-10-17 DIAGNOSIS — E66.01 MORBID (SEVERE) OBESITY DUE TO EXCESS CALORIES (HCC): Primary | ICD-10-CM

## 2019-10-17 DIAGNOSIS — N18.2 CKD (CHRONIC KIDNEY DISEASE), STAGE II: ICD-10-CM

## 2019-10-17 DIAGNOSIS — Z99.89 OBSTRUCTIVE SLEEP APNEA TREATED WITH CONTINUOUS POSITIVE AIRWAY PRESSURE (CPAP): ICD-10-CM

## 2019-10-17 PROBLEM — E66.813 OBESITY, CLASS III, BMI 40-49.9 (MORBID OBESITY): Status: ACTIVE | Noted: 2019-10-17

## 2019-10-17 PROCEDURE — 99213 OFFICE O/P EST LOW 20 MIN: CPT | Performed by: SURGERY

## 2019-10-17 RX ORDER — ACETAMINOPHEN 325 MG/1
975 TABLET ORAL ONCE
Status: CANCELLED | OUTPATIENT
Start: 2019-11-04 | End: 2019-10-17

## 2019-10-17 RX ORDER — OXYCODONE HYDROCHLORIDE 5 MG/1
5 TABLET ORAL EVERY 4 HOURS PRN
Qty: 15 TABLET | Refills: 0 | Status: SHIPPED | OUTPATIENT
Start: 2019-10-17 | End: 2020-05-19 | Stop reason: ALTCHOICE

## 2019-10-17 RX ORDER — HEPARIN SODIUM 5000 [USP'U]/ML
5000 INJECTION, SOLUTION INTRAVENOUS; SUBCUTANEOUS
Status: CANCELLED | OUTPATIENT
Start: 2019-11-04 | End: 2019-11-05

## 2019-10-17 RX ORDER — CEFAZOLIN SODIUM 2 G/50ML
2000 SOLUTION INTRAVENOUS ONCE
Status: CANCELLED | OUTPATIENT
Start: 2019-11-04 | End: 2019-10-17

## 2019-10-17 RX ORDER — CELECOXIB 200 MG/1
200 CAPSULE ORAL ONCE
Status: CANCELLED | OUTPATIENT
Start: 2019-11-04 | End: 2019-10-17

## 2019-10-17 RX ORDER — GABAPENTIN 300 MG/1
600 CAPSULE ORAL ONCE
Status: CANCELLED | OUTPATIENT
Start: 2019-11-04 | End: 2019-10-17

## 2019-10-17 RX ORDER — OMEPRAZOLE 20 MG/1
20 CAPSULE, DELAYED RELEASE ORAL DAILY
Qty: 90 CAPSULE | Refills: 3 | Status: SHIPPED | OUTPATIENT
Start: 2019-10-17 | End: 2020-05-19 | Stop reason: ALTCHOICE

## 2019-10-17 RX ORDER — SCOLOPAMINE TRANSDERMAL SYSTEM 1 MG/1
1 PATCH, EXTENDED RELEASE TRANSDERMAL ONCE
Status: CANCELLED | OUTPATIENT
Start: 2019-11-04 | End: 2019-10-17

## 2019-10-17 NOTE — H&P
BARIATRIC H&P - BARIATRIC SURGERY  Zach Henley 40 y o  male MRN: [id number]  Unit/Bed#:  Encounter: [id number]      HPI:  Zach Henley is a 40 y o  male who presents with a long-standing history of morbid obesity  He was found to be a good candidate to undergo a bariatric operation upon being [facility]  He is here today to discuss details of his surgery  Review of Systems   All other systems reviewed and are negative  Historical Information   Past Medical History:   Diagnosis Date    Anxiety     Morbid obesity ([address] )     Sleep apnea     Snoring      Past Surgical History:   Procedure Laterality Date    NO PAST SURGERIES      [place] EGD TRANSORAL BIOPSY SINGLE/MULTIPLE N/A 5/16/2018    Procedure: ESOPHAGOGASTRODUODENOSCOPY (EGD) with bx;  Surgeon: Araceli Kim MD;  Location: [facility]; Service: [address] [place] [address] TOOTH EXTRACTION       Social History   Social History     Substance and Sexual Activity   Alcohol Use Not Currently    Comment: rare social     Social History     Substance and Sexual Activity   Drug Use No     Social History     Tobacco Use   Smoking Status Never Smoker   Smokeless Tobacco Never Used     Family History: non-contributory    Meds/Allergies   all medications and allergies reviewed  Allergies   Allergen Reactions    Other      Seasonal allergies       Objective     Current Vitals:   Blood Pressure: 130/80 (10/17/19 1326)  Pulse: 63 (10/17/19 1326)  Temperature: (!) 97 4 °F (36 3 °C) (10/17/19 1326)  Temp Source: Tympanic (10/17/19 1326)  Height: 6' 1" (185 4 cm) (10/17/19 1326)  Weight - Scale: (!) 168 kg (370 lb) (10/17/19 1326)      Invasive Devices     None                 Physical Exam   Constitutional: He is oriented to person, place, and time  Vital signs are normal  He appears well-developed and well-nourished  No distress  HENT:   Head: Normocephalic and atraumatic     Nose: Nose normal    Mouth/Throat: Oropharynx is clear and moist    Eyes: Conjunctivae are normal  Right eye exhibits no discharge  Left eye exhibits no discharge  No scleral icterus  Neck: Normal range of motion  Neck supple  Cardiovascular: Normal rate, regular rhythm, normal heart sounds and intact distal pulses  Pulmonary/Chest: Effort normal and breath sounds normal  No stridor  No respiratory distress  He has no wheezes  He has no rales  He exhibits no tenderness  Abdominal: Soft  Normal appearance and bowel sounds are normal  There is no tenderness  There is no rebound, no guarding and no CVA tenderness  Abdomen is obese, soft and benign  Musculoskeletal: Normal range of motion  He exhibits no deformity  Lymphadenopathy:     He has no cervical adenopathy  Neurological: He is alert and oriented to person, place, and time  Skin: Skin is warm and dry  No rash noted  He is not diaphoretic  No erythema  Psychiatric: He has a normal mood and affect  His behavior is normal  Judgment and thought content normal    Nursing note and vitals reviewed  Lab Results: I have personally reviewed pertinent lab results  Imaging: I have personally reviewed pertinent reports  EKG, Pathology, and Other Studies: I have personally reviewed pertinent reports  The endoscopy showed gastritis and the biopsies   A  Stomach, antrum, biopsy:             - Oxyntic mucosa with chronic inactive gastritis  - No Helicobacter pylori organisms are identified on the immunohistochemical stain, performed with an appropriate control              - No intestinal metaplasia, dysplasia or malignancy is identified       Assessment/PLAN:    40 y o  yo male morbidly obese found to be a good candidate to undergo a weight loss operation upon being enrolled here at the [facility]       Patient has a long history of morbid obesity and is presenting to discuss the surgical weight loss options   Despite the patient best efforts patient was unable to lose any meaningful or sustainable weight using nonsurgical means  We had a long discussion regarding all the surgical weight-loss options at our disposal at this point and reviewed the risks and benefits of each procedure in details as it relates to her age, BMI and medical conditions  He has been pre certified to undergo a Laparoscopic Jaquelin-en-Y gastric bypass possible sleeve gastrectomy  Here today to review his pre op test results  Has been medically cleared for the procedure     +++++++++++++++++++++++++++++++++++  He has obstructive sleep apnea and wears a CPAP machine  He has elevated creatinine with his most recent levels between 1 26 and 1 37  ++++++++++++++++++++++++++++++++++    I have discussed with him at length the risks and benefits of the operation and reiterated the components of our multidisciplinary program and the importance of compliance and follow up in the post operative period  Although there is a great statistical chance of improvement or even resolution of most of his associated comorbidities, the results vary from patient to patient and they largely depend on his commitment  The patient was also instructed with regards to the importance of behavior modification, nutritional counseling, support meeting attendance and lifestyle changes that are important to ensure success  He was given the opportunity to ask questions and I have answered all of them  I have addressed with the patient the level of CODE STATUS for this hospital stay and after explaining the different options currently he wishes to be a Level I  He understands and wishes to proceed      He still needs to lose 12 more lb prior to the surgery

## 2019-10-17 NOTE — H&P (VIEW-ONLY)
BARIATRIC H&P - BARIATRIC SURGERY  Noelle Romero 40 y o  male MRN: [id number]  Unit/Bed#:  Encounter: [id number]      HPI:  Noelle Romero is a 40 y o  male who presents with a long-standing history of morbid obesity  He was found to be a good candidate to undergo a bariatric operation upon being [facility]  He is here today to discuss details of his surgery  Review of Systems   All other systems reviewed and are negative  Historical Information   Past Medical History:   Diagnosis Date    Anxiety     Morbid obesity ([address] )     Sleep apnea     Snoring      Past Surgical History:   Procedure Laterality Date    NO PAST SURGERIES      SD EGD TRANSORAL BIOPSY SINGLE/MULTIPLE N/A 5/16/2018    Procedure: ESOPHAGOGASTRODUODENOSCOPY (EGD) with bx;  Surgeon: Bill Hernandez MD;  Location: [facility]; Service: [address] [place] [address] TOOTH EXTRACTION       Social History   Social History     Substance and Sexual Activity   Alcohol Use Not Currently    Comment: rare social     Social History     Substance and Sexual Activity   Drug Use No     Social History     Tobacco Use   Smoking Status Never Smoker   Smokeless Tobacco Never Used     Family History: non-contributory    Meds/Allergies   all medications and allergies reviewed  Allergies   Allergen Reactions    Other      Seasonal allergies       Objective     Current Vitals:   Blood Pressure: 130/80 (10/17/19 1326)  Pulse: 63 (10/17/19 1326)  Temperature: (!) 97 4 °F (36 3 °C) (10/17/19 1326)  Temp Source: Tympanic (10/17/19 1326)  Height: 6' 1" (185 4 cm) (10/17/19 1326)  Weight - Scale: (!) 168 kg (370 lb) (10/17/19 1326)      Invasive Devices     None                 Physical Exam   Constitutional: He is oriented to person, place, and time  Vital signs are normal  He appears well-developed and well-nourished  No distress  HENT:   Head: Normocephalic and atraumatic     Nose: Nose normal    Mouth/Throat: Oropharynx is clear and moist    Eyes: Conjunctivae are normal  Right eye exhibits no discharge  Left eye exhibits no discharge  No scleral icterus  Neck: Normal range of motion  Neck supple  Cardiovascular: Normal rate, regular rhythm, normal heart sounds and intact distal pulses  Pulmonary/Chest: Effort normal and breath sounds normal  No stridor  No respiratory distress  He has no wheezes  He has no rales  He exhibits no tenderness  Abdominal: Soft  Normal appearance and bowel sounds are normal  There is no tenderness  There is no rebound, no guarding and no CVA tenderness  Abdomen is obese, soft and benign  Musculoskeletal: Normal range of motion  He exhibits no deformity  Lymphadenopathy:     He has no cervical adenopathy  Neurological: He is alert and oriented to person, place, and time  Skin: Skin is warm and dry  No rash noted  He is not diaphoretic  No erythema  Psychiatric: He has a normal mood and affect  His behavior is normal  Judgment and thought content normal    Nursing note and vitals reviewed  Lab Results: I have personally reviewed pertinent lab results  Imaging: I have personally reviewed pertinent reports  EKG, Pathology, and Other Studies: I have personally reviewed pertinent reports  The endoscopy showed gastritis and the biopsies   A  Stomach, antrum, biopsy:             - Oxyntic mucosa with chronic inactive gastritis  - No Helicobacter pylori organisms are identified on the immunohistochemical stain, performed with an appropriate control              - No intestinal metaplasia, dysplasia or malignancy is identified       Assessment/PLAN:    40 y o  yo male morbidly obese found to be a good candidate to undergo a weight loss operation upon being enrolled here at the [facility]       Patient has a long history of morbid obesity and is presenting to discuss the surgical weight loss options   Despite the patient best efforts patient was unable to lose any meaningful or sustainable weight using nonsurgical means  We had a long discussion regarding all the surgical weight-loss options at our disposal at this point and reviewed the risks and benefits of each procedure in details as it relates to her age, BMI and medical conditions  He has been pre certified to undergo a Laparoscopic Jaquelin-en-Y gastric bypass possible sleeve gastrectomy  Here today to review his pre op test results  Has been medically cleared for the procedure     +++++++++++++++++++++++++++++++++++  He has obstructive sleep apnea and wears a CPAP machine  He has elevated creatinine with his most recent levels between 1 26 and 1 37  ++++++++++++++++++++++++++++++++++    I have discussed with him at length the risks and benefits of the operation and reiterated the components of our multidisciplinary program and the importance of compliance and follow up in the post operative period  Although there is a great statistical chance of improvement or even resolution of most of his associated comorbidities, the results vary from patient to patient and they largely depend on his commitment  The patient was also instructed with regards to the importance of behavior modification, nutritional counseling, support meeting attendance and lifestyle changes that are important to ensure success  He was given the opportunity to ask questions and I have answered all of them  I have addressed with the patient the level of CODE STATUS for this hospital stay and after explaining the different options currently he wishes to be a Level I  He understands and wishes to proceed      He still needs to lose 12 more lb prior to the surgery

## 2019-10-23 NOTE — PROGRESS NOTES
Bariatric Nutrition Follow-up Note    Type of surgery    Preop Jaquelin-en-Y  Surgery Date: Scheduled for 11/4/2019  Surgeon : Dr Na Gandhi  40 y o   male     [place] with BMI of 25: 188 6 lb   Pre-Op Excess Wt: 184 9 lb  Wt (!) 164 kg (361 lb 8 oz)   BMI 47 69 kg/m²       8  5# wt loss since last office visit 1 week ago  Overall 11 9 lbs weight loss since starting the program   Pt needs additional 6 5# wt loss prior to surgery  [facility] Equation:  BMR: 2648 kcal per day  Estimated calories for weight loss =0322-2592 kcal per day (1 to 2 pounds per week sedentary)   Estimated protein needs = 86-97 grams per day ( 8- 9 grams adjusted BW)  108 kg adjusted body weight with renal disease       Review of History and Medications   Past Medical History:   Diagnosis Date    Anxiety     Morbid obesity ([facility] [address] )     Sleep apnea     Snoring      Past Surgical History:   Procedure Laterality Date    NO PAST SURGERIES      MD EGD TRANSORAL BIOPSY SINGLE/MULTIPLE N/A 5/16/2018    Procedure: ESOPHAGOGASTRODUODENOSCOPY (EGD) with bx;  Surgeon: Casey Murdock MD;  Location: [facility];   Service: Bariatrics    WISDOM TOOTH EXTRACTION       Social History     Socioeconomic History    Marital status: /Civil Union     Spouse name: Not on file    Number of children: Not on file    Years of education: Not on file    Highest education level: Not on file   Occupational History    Not on file   Social Needs    Financial resource strain: Not on file    Food insecurity:     Worry: Not on file     Inability: Not on file    Transportation needs:     Medical: Not on file     Non-medical: Not on file   Tobacco Use    Smoking status: Never Smoker    Smokeless tobacco: Never Used   Substance and Sexual Activity    Alcohol use: Not Currently     Comment: rare social    Drug use: No    Sexual activity: Yes     Birth control/protection: Female Sterilization   Lifestyle    Physical activity:     Days per week: Not on file     Minutes per session: Not on file    Stress: Not on file   Relationships    Social connections:     Talks on phone: Not on file     Gets together: Not on file     Attends Restorationism service: Not on file     Active member of club or organization: Not on file     Attends meetings of clubs or organizations: Not on file     Relationship status: Not on file    Intimate partner violence:     Fear of current or ex partner: Not on file     Emotionally abused: Not on file     Physically abused: Not on file     Forced sexual activity: Not on file   Other Topics Concern    Not on file   Social History Narrative    Lives with wife and 2 kids    Part time work from home       Current Outpatient Medications:     omeprazole (PriLOSEC) 20 mg delayed release capsule, Take 1 capsule (20 mg total) by mouth daily, Disp: 90 capsule, Rfl: 3    oxyCODONE (ROXICODONE) 5 mg immediate release tablet, Take 1 tablet (5 mg total) by mouth every 4 (four) hours as needed for moderate pain For continuing therapyMax Daily Amount: 30 mg, Disp: 15 tablet, Rfl: 0   Taking MVI daily, vitamin D inconsistently    Food Intake and Lifestyle Assessment   Food Intake Assessment completed via usual intake  Pt has started pre-operative liver shrinking liquid protein diet on Sukumar 10/19/19  Today is day 5 of liquid diet  Pt purchsed JBTIFFANIE Confidence protein powder and is drinking three to four protein drinks per day mixed with unsweetened almond milk  Pt is having 1-2 sugar-free jello cups and 1-2 cups nonstarchy vegetables per day  Pt is drinking only sugar-free decaffeinated beverages  Pt is practicing sipping small sips throughout his day    Beverage intake: water, coffee/tea - has eliminated sugar sweetened beverages  Protein supplement: JBN Confidence whey Protein Isolate powder  Estimated protein intake per day:  grams   Estimated fluid intake per day: 80 oz+  Meals eaten away from home: 2 per week Typical meal pattern: 3 meals per day and 0-1 snacks per day  Eating Behaviors: Consumption of high calorie/ high fat foods, Large portion sizes, Craves sweet foods and stress eating   Food allergies or intolerances: Allergies   Allergen Reactions    Other      Seasonal allergies     Cultural or Confucianist considerations:      Physical Assessment  Physical Activity  Types of exercise: Tracks steps 6000 to 7000 per day  Pt reports his job is sedentary and his biggest barrier to exercising outside of work is time  Pt is not currently exercising  Current physical limitations: none noted, short on breath     Psychosocial Assessment   Support systems: spouse sibling(s) friend(s)   Socioeconomic factors: patient now has a full time job out of the home   His wife is also in process to get weight loss surgery, had initial appointment     Nutrition Diagnosis (continued/improving)   Diagnosis: Overweight / Obesity (NC-3 3)  Related to: Physical inactivity and Excessive energy intake  As Evidenced by: BMI>35 - improved with 5 pound weight loss     Nutrition Prescription: Recommend the following diet  Regular  Calculated macros based on 2200 kcal/day weight loss goal from above:  50% CHO= 275g/day  30% fats= 73g/day  20% Pro=110 g/day    Pt is currently on pre-op liver shrinking diet:  <50g CHO, 90-120g Pro, 800-1000 kcals  Interventions and Teaching   Discussed pre-op and post-op nutrition guidelines  Patient educated and handouts provided   -Protein snacks list provided  Adequate hydration  Exercise  Suggestions for pre-op diet:    Nutrition considerations after surgery  Protein supplements:    Appropriate carbohydrate, protein, and fat intake, and food/fluid choices to maximize safe weight loss, nutrient intake, and tolerance  Dietary and lifestyle changes  Possible problems with poor eating habits  Intuitive eating  Techniques for self monitoring and keeping daily food journal  Vitamin / Mineral supplementation of Multivitamin with minerals  Patient is taking additional  2000 IU of vitamin D as recommended  Pt purchased the soft chew bariatric advantage multivitamins  Pt needs to purchase calcium supplement  Also discussed Iron+Thiamine+copper capsule  Education provided to: patient    Barriers to learning: No barriers identified    Readiness to change: Action     Comprehension: verbalized  understanding     Expected Compliance: good    Evaluation / Monitoring  Dietitian to Monitor: Eating pattern as discussed Tolerance of nutrition prescription Body weight Lab values Physical activity    Goals  Needs to lose 12# from final H+P weight of 270# by day of surgery  Zawh=630#    Time Spent:   30 minutes

## 2019-10-24 ENCOUNTER — OFFICE VISIT (OUTPATIENT)
Dept: BARIATRICS | Facility: CLINIC | Age: 44
End: 2019-10-24

## 2019-10-24 VITALS — WEIGHT: 315 LBS | BODY MASS INDEX: 47.69 KG/M2

## 2019-10-24 DIAGNOSIS — E66.01 MORBID (SEVERE) OBESITY DUE TO EXCESS CALORIES (HCC): Primary | ICD-10-CM

## 2019-10-24 DIAGNOSIS — E66.01 OBESITY, CLASS III, BMI 40-49.9 (MORBID OBESITY) (HCC): ICD-10-CM

## 2019-10-24 DIAGNOSIS — E66.01 MORBID OBESITY (HCC): ICD-10-CM

## 2019-10-24 PROCEDURE — RECHECK: Performed by: DIETITIAN, REGISTERED

## 2019-10-29 RX ORDER — MULTIVITAMIN
1 TABLET ORAL DAILY
COMMUNITY

## 2019-10-29 NOTE — PRE-PROCEDURE INSTRUCTIONS
Pre-Surgery Instructions:   Medication Instructions    Multiple Vitamin (MULTIVITAMIN) tablet Patient was instructed by Physician and understands  [facility] preop instructions reviewed with pt  Pt has surgical soap  ERAS information reviewed with pt

## 2019-10-30 ENCOUNTER — TELEPHONE (OUTPATIENT)
Dept: BARIATRICS | Facility: CLINIC | Age: 44
End: 2019-10-30

## 2019-11-01 ENCOUNTER — ANESTHESIA EVENT (OUTPATIENT)
Dept: PERIOP | Facility: HOSPITAL | Age: 44
DRG: 621 | End: 2019-11-01
Payer: COMMERCIAL

## 2019-11-04 ENCOUNTER — ANESTHESIA (OUTPATIENT)
Dept: PERIOP | Facility: HOSPITAL | Age: 44
DRG: 621 | End: 2019-11-04
Payer: COMMERCIAL

## 2019-11-04 ENCOUNTER — HOSPITAL ENCOUNTER (INPATIENT)
Facility: HOSPITAL | Age: 44
LOS: 1 days | Discharge: HOME | DRG: 621 | End: 2019-11-05
Attending: SURGERY | Admitting: SURGERY
Payer: COMMERCIAL

## 2019-11-04 DIAGNOSIS — E66.01 MORBID OBESITY (HCC): Primary | ICD-10-CM

## 2019-11-04 PROCEDURE — 43644 LAP GASTRIC BYPASS/ROUX-EN-Y: CPT | Performed by: PHYSICIAN ASSISTANT

## 2019-11-04 PROCEDURE — 0DJ08ZZ INSPECTION OF UPPER INTESTINAL TRACT, VIA NATURAL OR ARTIFICIAL OPENING ENDOSCOPIC: ICD-10-PCS | Performed by: SURGERY

## 2019-11-04 PROCEDURE — 43644 LAP GASTRIC BYPASS/ROUX-EN-Y: CPT | Performed by: SURGERY

## 2019-11-04 PROCEDURE — 0D164ZA BYPASS STOMACH TO JEJUNUM, PERCUTANEOUS ENDOSCOPIC APPROACH: ICD-10-PCS | Performed by: SURGERY

## 2019-11-04 PROCEDURE — C9290 INJ, BUPIVACAINE LIPOSOME: HCPCS | Performed by: SURGERY

## 2019-11-04 DEVICE — SEAMGUARD STPL REINF ENDO GIA ULTRA UNIV 60 PURPLE: Type: IMPLANTABLE DEVICE | Site: STOMACH | Status: FUNCTIONAL

## 2019-11-04 RX ORDER — GABAPENTIN 300 MG/1
600 CAPSULE ORAL ONCE
Status: COMPLETED | OUTPATIENT
Start: 2019-11-04 | End: 2019-11-04

## 2019-11-04 RX ORDER — OXYCODONE HCL 5 MG/5 ML
10 SOLUTION, ORAL ORAL EVERY 4 HOURS PRN
Status: DISCONTINUED | OUTPATIENT
Start: 2019-11-04 | End: 2019-11-05 | Stop reason: HOSPADM

## 2019-11-04 RX ORDER — HYDROMORPHONE HCL/PF 1 MG/ML
1 SYRINGE (ML) INJECTION EVERY 4 HOURS PRN
Status: DISCONTINUED | OUTPATIENT
Start: 2019-11-04 | End: 2019-11-05 | Stop reason: HOSPADM

## 2019-11-04 RX ORDER — HYDROMORPHONE HCL/PF 1 MG/ML
0.5 SYRINGE (ML) INJECTION
Status: DISCONTINUED | OUTPATIENT
Start: 2019-11-04 | End: 2019-11-04 | Stop reason: HOSPADM

## 2019-11-04 RX ORDER — KETOROLAC TROMETHAMINE 30 MG/ML
15 INJECTION, SOLUTION INTRAMUSCULAR; INTRAVENOUS EVERY 6 HOURS SCHEDULED
Status: COMPLETED | OUTPATIENT
Start: 2019-11-04 | End: 2019-11-05

## 2019-11-04 RX ORDER — ONDANSETRON 2 MG/ML
4 INJECTION INTRAMUSCULAR; INTRAVENOUS EVERY 4 HOURS PRN
Status: DISCONTINUED | OUTPATIENT
Start: 2019-11-04 | End: 2019-11-05 | Stop reason: HOSPADM

## 2019-11-04 RX ORDER — BUPIVACAINE HYDROCHLORIDE 5 MG/ML
INJECTION, SOLUTION PERINEURAL AS NEEDED
Status: DISCONTINUED | OUTPATIENT
Start: 2019-11-04 | End: 2019-11-04 | Stop reason: HOSPADM

## 2019-11-04 RX ORDER — ACETAMINOPHEN 325 MG/1
975 TABLET ORAL EVERY 8 HOURS SCHEDULED
Status: DISCONTINUED | OUTPATIENT
Start: 2019-11-04 | End: 2019-11-05 | Stop reason: HOSPADM

## 2019-11-04 RX ORDER — OXYCODONE HCL 5 MG/5 ML
5 SOLUTION, ORAL ORAL EVERY 4 HOURS PRN
Status: DISCONTINUED | OUTPATIENT
Start: 2019-11-04 | End: 2019-11-05 | Stop reason: HOSPADM

## 2019-11-04 RX ORDER — ACETAMINOPHEN 325 MG/1
975 TABLET ORAL ONCE
Status: COMPLETED | OUTPATIENT
Start: 2019-11-04 | End: 2019-11-04

## 2019-11-04 RX ORDER — DEXAMETHASONE SODIUM PHOSPHATE 10 MG/ML
INJECTION, SOLUTION INTRAMUSCULAR; INTRAVENOUS AS NEEDED
Status: DISCONTINUED | OUTPATIENT
Start: 2019-11-04 | End: 2019-11-04 | Stop reason: SURG

## 2019-11-04 RX ORDER — MIDAZOLAM HYDROCHLORIDE 2 MG/2ML
INJECTION, SOLUTION INTRAMUSCULAR; INTRAVENOUS AS NEEDED
Status: DISCONTINUED | OUTPATIENT
Start: 2019-11-04 | End: 2019-11-04 | Stop reason: SURG

## 2019-11-04 RX ORDER — FENTANYL CITRATE 50 UG/ML
INJECTION, SOLUTION INTRAMUSCULAR; INTRAVENOUS AS NEEDED
Status: DISCONTINUED | OUTPATIENT
Start: 2019-11-04 | End: 2019-11-04 | Stop reason: SURG

## 2019-11-04 RX ORDER — SODIUM CHLORIDE 9 MG/ML
125 INJECTION, SOLUTION INTRAVENOUS CONTINUOUS
Status: DISCONTINUED | OUTPATIENT
Start: 2019-11-04 | End: 2019-11-04 | Stop reason: HOSPADM

## 2019-11-04 RX ORDER — ONDANSETRON 2 MG/ML
INJECTION INTRAMUSCULAR; INTRAVENOUS AS NEEDED
Status: DISCONTINUED | OUTPATIENT
Start: 2019-11-04 | End: 2019-11-04 | Stop reason: SURG

## 2019-11-04 RX ORDER — LIDOCAINE HYDROCHLORIDE 10 MG/ML
INJECTION, SOLUTION INFILTRATION; PERINEURAL AS NEEDED
Status: DISCONTINUED | OUTPATIENT
Start: 2019-11-04 | End: 2019-11-04 | Stop reason: SURG

## 2019-11-04 RX ORDER — HEPARIN SODIUM 5000 [USP'U]/ML
5000 INJECTION, SOLUTION INTRAVENOUS; SUBCUTANEOUS
Status: COMPLETED | OUTPATIENT
Start: 2019-11-04 | End: 2019-11-04

## 2019-11-04 RX ORDER — MAGNESIUM HYDROXIDE 1200 MG/15ML
LIQUID ORAL AS NEEDED
Status: DISCONTINUED | OUTPATIENT
Start: 2019-11-04 | End: 2019-11-04 | Stop reason: HOSPADM

## 2019-11-04 RX ORDER — ONDANSETRON 2 MG/ML
4 INJECTION INTRAMUSCULAR; INTRAVENOUS ONCE AS NEEDED
Status: DISCONTINUED | OUTPATIENT
Start: 2019-11-04 | End: 2019-11-04 | Stop reason: HOSPADM

## 2019-11-04 RX ORDER — PROPOFOL 10 MG/ML
INJECTION, EMULSION INTRAVENOUS AS NEEDED
Status: DISCONTINUED | OUTPATIENT
Start: 2019-11-04 | End: 2019-11-04 | Stop reason: SURG

## 2019-11-04 RX ORDER — SCOLOPAMINE TRANSDERMAL SYSTEM 1 MG/1
1 PATCH, EXTENDED RELEASE TRANSDERMAL ONCE
Status: DISCONTINUED | OUTPATIENT
Start: 2019-11-04 | End: 2019-11-04

## 2019-11-04 RX ORDER — SODIUM CHLORIDE, SODIUM LACTATE, POTASSIUM CHLORIDE, CALCIUM CHLORIDE 600; 310; 30; 20 MG/100ML; MG/100ML; MG/100ML; MG/100ML
75 INJECTION, SOLUTION INTRAVENOUS CONTINUOUS
Status: DISCONTINUED | OUTPATIENT
Start: 2019-11-04 | End: 2019-11-05 | Stop reason: HOSPADM

## 2019-11-04 RX ORDER — HYDROMORPHONE HCL/PF 1 MG/ML
SYRINGE (ML) INJECTION AS NEEDED
Status: DISCONTINUED | OUTPATIENT
Start: 2019-11-04 | End: 2019-11-04 | Stop reason: SURG

## 2019-11-04 RX ORDER — SODIUM CHLORIDE 9 MG/ML
INJECTION INTRAVENOUS AS NEEDED
Status: DISCONTINUED | OUTPATIENT
Start: 2019-11-04 | End: 2019-11-04 | Stop reason: HOSPADM

## 2019-11-04 RX ORDER — CELECOXIB 200 MG/1
200 CAPSULE ORAL ONCE
Status: COMPLETED | OUTPATIENT
Start: 2019-11-04 | End: 2019-11-04

## 2019-11-04 RX ORDER — PROMETHAZINE HYDROCHLORIDE 25 MG/ML
25 INJECTION, SOLUTION INTRAMUSCULAR; INTRAVENOUS EVERY 4 HOURS PRN
Status: DISCONTINUED | OUTPATIENT
Start: 2019-11-04 | End: 2019-11-05 | Stop reason: HOSPADM

## 2019-11-04 RX ORDER — ROCURONIUM BROMIDE 10 MG/ML
INJECTION, SOLUTION INTRAVENOUS AS NEEDED
Status: DISCONTINUED | OUTPATIENT
Start: 2019-11-04 | End: 2019-11-04 | Stop reason: SURG

## 2019-11-04 RX ORDER — METOCLOPRAMIDE HYDROCHLORIDE 5 MG/ML
10 INJECTION INTRAMUSCULAR; INTRAVENOUS ONCE AS NEEDED
Status: CANCELLED | OUTPATIENT
Start: 2019-11-04

## 2019-11-04 RX ORDER — GLYCOPYRROLATE 0.2 MG/ML
INJECTION INTRAMUSCULAR; INTRAVENOUS AS NEEDED
Status: DISCONTINUED | OUTPATIENT
Start: 2019-11-04 | End: 2019-11-04 | Stop reason: SURG

## 2019-11-04 RX ORDER — SIMETHICONE 80 MG
80 TABLET,CHEWABLE ORAL EVERY 12 HOURS SCHEDULED
Status: DISCONTINUED | OUTPATIENT
Start: 2019-11-04 | End: 2019-11-05 | Stop reason: HOSPADM

## 2019-11-04 RX ORDER — NEOSTIGMINE METHYLSULFATE 1 MG/ML
INJECTION INTRAVENOUS AS NEEDED
Status: DISCONTINUED | OUTPATIENT
Start: 2019-11-04 | End: 2019-11-04 | Stop reason: SURG

## 2019-11-04 RX ORDER — CEFAZOLIN SODIUM 2 G/50ML
2000 SOLUTION INTRAVENOUS EVERY 8 HOURS
Status: COMPLETED | OUTPATIENT
Start: 2019-11-04 | End: 2019-11-05

## 2019-11-04 RX ORDER — METOCLOPRAMIDE HYDROCHLORIDE 5 MG/ML
10 INJECTION INTRAMUSCULAR; INTRAVENOUS EVERY 6 HOURS PRN
Status: DISCONTINUED | OUTPATIENT
Start: 2019-11-04 | End: 2019-11-05 | Stop reason: HOSPADM

## 2019-11-04 RX ORDER — CEFAZOLIN SODIUM 2 G/50ML
2000 SOLUTION INTRAVENOUS ONCE
Status: COMPLETED | OUTPATIENT
Start: 2019-11-04 | End: 2019-11-04

## 2019-11-04 RX ADMIN — GABAPENTIN 600 MG: 300 CAPSULE ORAL at 07:34

## 2019-11-04 RX ADMIN — PHENYLEPHRINE HYDROCHLORIDE 100 MCG: 10 INJECTION INTRAVENOUS at 09:27

## 2019-11-04 RX ADMIN — KETOROLAC TROMETHAMINE 15 MG: 30 INJECTION, SOLUTION INTRAMUSCULAR at 13:19

## 2019-11-04 RX ADMIN — MIDAZOLAM 2 MG: 1 INJECTION INTRAMUSCULAR; INTRAVENOUS at 09:14

## 2019-11-04 RX ADMIN — SIMETHICONE CHEW TAB 80 MG 80 MG: 80 TABLET ORAL at 21:47

## 2019-11-04 RX ADMIN — GLYCOPYRROLATE 0.4 MG: 0.2 INJECTION INTRAMUSCULAR; INTRAVENOUS at 11:33

## 2019-11-04 RX ADMIN — HYDROMORPHONE HYDROCHLORIDE 0.5 MG: 1 INJECTION, SOLUTION INTRAMUSCULAR; INTRAVENOUS; SUBCUTANEOUS at 09:44

## 2019-11-04 RX ADMIN — ROCURONIUM BROMIDE 50 MG: 50 INJECTION, SOLUTION INTRAVENOUS at 09:27

## 2019-11-04 RX ADMIN — HYDROMORPHONE HYDROCHLORIDE 0.5 MG: 1 INJECTION, SOLUTION INTRAMUSCULAR; INTRAVENOUS; SUBCUTANEOUS at 12:01

## 2019-11-04 RX ADMIN — ACETAMINOPHEN 975 MG: 325 TABLET ORAL at 07:32

## 2019-11-04 RX ADMIN — SODIUM CHLORIDE: 0.9 INJECTION, SOLUTION INTRAVENOUS at 11:25

## 2019-11-04 RX ADMIN — ACETAMINOPHEN 975 MG: 325 TABLET ORAL at 21:44

## 2019-11-04 RX ADMIN — CELECOXIB 200 MG: 200 CAPSULE ORAL at 07:33

## 2019-11-04 RX ADMIN — FAMOTIDINE 20 MG: 10 INJECTION, SOLUTION INTRAVENOUS at 17:50

## 2019-11-04 RX ADMIN — METRONIDAZOLE 500 MG: 500 INJECTION, SOLUTION INTRAVENOUS at 18:42

## 2019-11-04 RX ADMIN — ROCURONIUM BROMIDE 20 MG: 50 INJECTION, SOLUTION INTRAVENOUS at 10:39

## 2019-11-04 RX ADMIN — PROPOFOL 200 MG: 10 INJECTION, EMULSION INTRAVENOUS at 09:27

## 2019-11-04 RX ADMIN — CEFAZOLIN SODIUM 2000 MG: 2 SOLUTION INTRAVENOUS at 17:50

## 2019-11-04 RX ADMIN — METRONIDAZOLE 500 MG: 500 INJECTION, SOLUTION INTRAVENOUS at 09:33

## 2019-11-04 RX ADMIN — SCOPALAMINE 1 PATCH: 1 PATCH, EXTENDED RELEASE TRANSDERMAL at 07:31

## 2019-11-04 RX ADMIN — SODIUM CHLORIDE 125 ML/HR: 0.9 INJECTION, SOLUTION INTRAVENOUS at 07:42

## 2019-11-04 RX ADMIN — ACETAMINOPHEN 975 MG: 325 TABLET ORAL at 14:29

## 2019-11-04 RX ADMIN — LIDOCAINE HYDROCHLORIDE 100 MG: 10 INJECTION, SOLUTION INFILTRATION; PERINEURAL at 09:27

## 2019-11-04 RX ADMIN — KETOROLAC TROMETHAMINE 15 MG: 30 INJECTION, SOLUTION INTRAMUSCULAR at 17:51

## 2019-11-04 RX ADMIN — FENTANYL CITRATE 100 MCG: 50 INJECTION, SOLUTION INTRAMUSCULAR; INTRAVENOUS at 09:22

## 2019-11-04 RX ADMIN — DEXAMETHASONE SODIUM PHOSPHATE 4 MG: 10 INJECTION, SOLUTION INTRAMUSCULAR; INTRAVENOUS at 09:34

## 2019-11-04 RX ADMIN — CEFAZOLIN SODIUM 2000 MG: 2 SOLUTION INTRAVENOUS at 09:14

## 2019-11-04 RX ADMIN — SIMETHICONE CHEW TAB 80 MG 80 MG: 80 TABLET ORAL at 14:30

## 2019-11-04 RX ADMIN — FAMOTIDINE 20 MG: 10 INJECTION, SOLUTION INTRAVENOUS at 13:18

## 2019-11-04 RX ADMIN — SODIUM CHLORIDE, SODIUM LACTATE, POTASSIUM CHLORIDE, AND CALCIUM CHLORIDE 75 ML/HR: .6; .31; .03; .02 INJECTION, SOLUTION INTRAVENOUS at 13:18

## 2019-11-04 RX ADMIN — NEOSTIGMINE METHYLSULFATE 3 MG: 1 INJECTION, SOLUTION INTRAVENOUS at 11:33

## 2019-11-04 RX ADMIN — HEPARIN SODIUM 5000 UNITS: 5000 INJECTION INTRAVENOUS; SUBCUTANEOUS at 08:30

## 2019-11-04 RX ADMIN — ONDANSETRON 4 MG: 2 INJECTION INTRAMUSCULAR; INTRAVENOUS at 09:34

## 2019-11-04 RX ADMIN — ROCURONIUM BROMIDE 30 MG: 50 INJECTION, SOLUTION INTRAVENOUS at 10:20

## 2019-11-04 NOTE — OP NOTE
[facility]   [address] [facility], [address]  [phone] (Fax)      Operative Report  LAP CORRIE-EN-Y GASTRIC BYPASS, INTRAOP EGD     Patient Name: Garrett Torres    :  1975  MRN: [id number]  [facility]: [facility]  Surgery Date : 2019  Surgeons:  Surgeon(s) and Role:     * Berry Jarrett MD - Primary     * Vickie Patel PA-C - Aleksander Esteban MD     Diagnosis:    Pre-Op Diagnosis Codes: Morbid obesity ([facility] [address] ) [E66 01]  Body mass index is 46 31 kg/m²  Sleep apnea, unspecified type [G47 30]    Post-Op Diagnosis Codes:     * Morbid obesity ([facility] [address] ) [E66 01]     * Body mass index is 46 31 kg/m²  * Sleep apnea, unspecified type [G47 30]    Procedure  1  Laparoscopic Corrie-en-Y Gastric Bypass  2  Intraoperative Endoscopy    Specimen(s):  * No specimens in log *    Estimated Blood Loss:    50 mL * No LDAs found *    Anesthesia Type:     General    Operative Indications: Morbid obesity ([facility] [address] ) [E66 01]  Sleep apnea, unspecified type [G47 30]  Body mass index is 46 31 kg/m²  Operative Findings:    Normal anatomy    Complications:     None    Procedure and Technique:    INDICATION:    Garrett Torres is a 40 y o  male with a Body mass index is 46 31 kg/m²  and a long standing history of morbid obesity and inability to lose a significant amount of weight on its own  This patient was found to be a good candidate to undergo a bariatric procedure upon being enrolled here at the [address] [place] [address]  OPERATIVE TECHNIQUE    The patient was taken to the operating room and placed in a supine position  A dose of IV antibiotic prophylaxis that consisted of Ancef 2g and Metronidazole 500mg was given  Also, 5000 units of subcutaneous unfractionated heparin to prevent DVT were administered  Sequential compression devices were placed on both lower extremities      After satisfactory general anesthesia induction and endotracheal intubation was achieved, the extremities were secured to prevent neurovascular and musculoskeletal injuries as best as possible  Subsequently, the abdominal wall was prepped and draped in a surgical standard sterile fashion  After a timeout was done and the patient was properly identified and the type of procedure was confirmed a supra-umbilical transverse skin incision was made, and the subcutaneous tissues dissected  Access to the peritoneal cavity was gained with an optical trocar  With this device, we were able to visualize the layers of the abdominal wall, and enter the peritoneal cavity under direct visualization  Pneumoperitoneum was then established with CO2 insufflation  A four quadrant transversus abdominis plane block was performed under direct laparoscopic vision  After this was completed four additional trocars were placed: a 12 mm in the right upper quadrant subcostal position in the anterior axillary line, a 12-mm port was placed in the right flank midclavicular line, a 12-mm port was placed in the left upper quadrant subcostal position in the midclavicular line and another 12-mm port was placed in the left quadrant anterior axillary line lateral to the supraumbilical port  With the trocars in place, the dissection was begun  The omentum of the transverse colon was identified and elevated, this allowed for the ligament of Treitz to be visualized  The small bowel was run about 60 cm to a point distal from the ligament of Treitz and was divided with a stapler and a 60 mm cartridge  The Jaquelin limb was then measured at 100 cm, and the 100 cm cole was brought in side-to-side opposition to the biliopancreatic limb  A side-to-side jejunojejunostomy was then created  This was accomplished by first making an antimesenteric enterotomy with cautery energy device   We then positioned the laparoscopic stapler with a 60-mm cartridge within the lumen of the bowel to create a stapled side-to-side anastomosis  The enterotomy was then approximated with a 2-0 silk suture, subsequently elevated and the stapler was then reloaded and positioned perpendicularly to the first staple lines, just below the margin of the enterotomy on the antimesenteric border of the bowel and closed transversely utilizing an additional 60-mm cartridge  The resulting mesenteric defect was then closed with a running nonabsorbable suture  A Brolin stitch was placed to prevent kinking  At this point we repositioned the patient into a reverse Trendelenburg and the [facility] liver retractor was placed in the subxiphoid position through the use of a 5-mm trocar incision  We then turned our attention to the gastroesophageal junction  The left rafael was skeletonized dissecting at the angle of His  The pars flaccida was identified and incised  The lesser sac was entered below the lesser curve at the level just inferior to the take off of the left gastric artery  The left gastric artery and hepatic vagal branches were preserved  We then created a 30 cc gastric pouch  To accomplish this, serial firings of a laparoscopic stapler 60-mm cartridge were utilized  The staple lines were reinforced with a butressing material  We created a pouch based on the lesser curve and in vertical orientation  This was accomplished by a  transverse firing of the stapler along the inferior edge of the pouch and then vertical serial firings of the stapler to the angle of His  This completely  the pouch from the gastric remnant  A 25 mm circular stapler anvil was passed through the mouth and into the esophagus and subsequently placed within the pouch  The inferior edge of the pouch was then opened with cautery and the anvil stem was brought through the anterior aspect of the pouch close to the staple line  We proceeded to divide the omentum all the way to the transverse colon   The end of the Jaquelin limb was opened with the cautery and the circular stapling device was brought through the dilated left upper quadrant 12-mm port site, and passed through the open end of the Jaquelin limb  The Jaquelin limb was then passed in a antecolic and antegastric position to the pouch  This was accomplished without tension and without twist   The stem of the stapling device was then brought through the antimesenteric border of the Jaquelin limb adjacent to the pouch  The stem and the anvil were united and the stapler was fired  This created an end-to-side gastrojejunostomy  The excess Jaquelin limb proximal to the anastomosis was then resected with the cautery energy device and a laparoscopic stapler with a 60-mm cartridge  The anastomosis was reinforced with interrupted absorbable sutures at the intersection of the staple lines  The distal Jaquelin limb was occluded and an EGD as well as an air insufflation test were performed  No intraoperative bleeding nor leaks were detected  I then covered the G-J anastomosis with a tongue of omentum in a Bro patch fashion and secured it in place with a single 2/0 Vicryl stitch  The sponge, needle and instrument count was reported complete  The 12-mm port site on the left flank that was dilated for the circular stapler as well as the Visiport trocar were then closed with the use of a suture closure device and a 0 absorbable suture  The liver retractor was removed under direct laparoscopic visualization, and no bleeding was noted  The remaining ports were then also removed under laparoscopic visualization  The skin incisions were all closed with 4-0 absorbable subcuticular suture  The patient tolerated the procedure well, was extubated uneventfully and was transferred to the recovery room in stable condition  I was present for the entire length of the procedure as the attending of record    No qualified resident was available to assist   The presence of an assistant was necessary for camera holding, traction and counter traction and for help with suturing and stapling in addition to performing the intraop-EGD        Patient Disposition:    PACU     Signature: Araceli Kim MD  Date: November 4, 2019  Time: 11:31 AM

## 2019-11-04 NOTE — INTERVAL H&P NOTE
H&P reviewed  After examining the patient I find no changes in the patients condition since the H&P had been written      Vitals:    11/04/19 0713   BP: 149/81   Pulse: 98   Resp: 18   Temp: 97 7 °F (36 5 °C)   SpO2: 95%

## 2019-11-04 NOTE — PLAN OF CARE
Problem: PAIN - ADULT  Goal: Verbalizes/displays adequate comfort level or baseline comfort level  Description  Interventions:  - Encourage patient to monitor pain and request assistance  - Assess pain using appropriate pain scale  - Administer analgesics based on type and severity of pain and evaluate response  - Implement non-pharmacological measures as appropriate and evaluate response  - Consider cultural and social influences on pain and pain management  - Notify physician/advanced practitioner if interventions unsuccessful or patient reports new pain  Outcome: Progressing     Problem: INFECTION - ADULT  Goal: Absence or prevention of progression during hospitalization  Description  INTERVENTIONS:  - Assess and monitor for signs and symptoms of infection  - Monitor lab/diagnostic results  - Monitor all insertion sites, i e  indwelling lines, tubes, and drains  - Monitor endotracheal if appropriate and nasal secretions for changes in amount and color  - [place] appropriate cooling/warming therapies per order  - Administer medications as ordered  - Instruct and encourage patient and family to use good hand hygiene technique  - Identify and instruct in appropriate isolation precautions for identified infection/condition  Outcome: Progressing  Goal: Absence of fever/infection during neutropenic period  Description  INTERVENTIONS:  - Monitor WBC    Outcome: Progressing     Problem: SAFETY ADULT  Goal: Patient will remain free of falls  Description  INTERVENTIONS:  - Assess patient frequently for physical needs  -  Identify cognitive and physical deficits and behaviors that affect risk of falls    -  [place] fall precautions as indicated by assessment   - Educate patient/family on patient safety including physical limitations  - Instruct patient to call for assistance with activity based on assessment  - Modify environment to reduce risk of injury  - Consider OT/PT consult to assist with strengthening/mobility  Outcome: Progressing  Goal: Maintain or return to baseline ADL function  Description  INTERVENTIONS:  -  Assess patient's ability to carry out ADLs; assess patient's baseline for ADL function and identify physical deficits which impact ability to perform ADLs (bathing, care of mouth/teeth, toileting, grooming, dressing, etc )  - Assess/evaluate cause of self-care deficits   - Assess range of motion  - Assess patient's mobility; develop plan if impaired  - Assess patient's need for assistive devices and provide as appropriate  - Encourage maximum independence but intervene and supervise when necessary  - Involve family in performance of ADLs  - Assess for home care needs following discharge   - Consider OT consult to assist with ADL evaluation and planning for discharge  - Provide patient education as appropriate  Outcome: Progressing  Goal: Maintain or return mobility status to optimal level  Description  INTERVENTIONS:  - Assess patient's baseline mobility status (ambulation, transfers, stairs, etc )    - Identify cognitive and physical deficits and behaviors that affect mobility  - Identify mobility aids required to assist with transfers and/or ambulation (gait belt, sit-to-stand, lift, walker, cane, etc )  - [place] fall precautions as indicated by assessment  - Record patient progress and toleration of activity level on Mobility SBAR; progress patient to next Phase/Stage  - Instruct patient to call for assistance with activity based on assessment  - Consider rehabilitation consult to assist with strengthening/weightbearing, etc   Outcome: Progressing     Problem: DISCHARGE PLANNING  Goal: Discharge to home or other facility with appropriate resources  Description  INTERVENTIONS:  - Identify barriers to discharge w/patient and caregiver  - Arrange for needed discharge resources and transportation as appropriate  - Identify discharge learning needs (meds, wound care, etc )  - Arrange for interpretive services to assist at discharge as needed  - Refer to Case Management Department for coordinating discharge planning if the patient needs post-hospital services based on physician/advanced practitioner order or complex needs related to functional status, cognitive ability, or social support system  Outcome: Progressing     Problem: Knowledge Deficit  Goal: Patient/family/caregiver demonstrates understanding of disease process, treatment plan, medications, and discharge instructions  Description  Complete learning assessment and assess knowledge base    Interventions:  - Provide teaching at level of understanding  - Provide teaching via preferred learning methods  Outcome: Progressing

## 2019-11-04 NOTE — PROGRESS NOTES
Consent states possible sleeve gastrectomy, OR schedule does not, call placed to OR spoke with Catarina Soto made her aware   She stated " I will let them know"

## 2019-11-04 NOTE — ANESTHESIA PREPROCEDURE EVALUATION
Review of Systems/Medical History  Patient summary reviewed  Chart reviewed  No history of anesthetic complications     Cardiovascular   Pulmonary  Sleep apnea CPAP,        GI/Hepatic  Negative GI/hepatic ROS          Chronic kidney disease stage 2,        Endo/Other    Obesity  morbid obesity   GYN       Hematology  Negative hematology ROS      Musculoskeletal  Negative musculoskeletal ROS        Neurology  Negative neurology ROS      Psychology   Anxiety,              Physical Exam    Airway    Mallampati score: III  TM Distance: >3 FB  Neck ROM: full     Dental   No notable dental hx     Cardiovascular  Rhythm: regular, Rate: normal, Cardiovascular exam normal    Pulmonary  Breath sounds clear to auscultation,     Other Findings        Anesthesia Plan  ASA Score- 3     Anesthesia Type- general with ASA Monitors  Additional Monitors:   Airway Plan: ETT  Comment: SCOP patch ordered  Plan Factors-Patient not instructed to abstain from smoking on day of procedure  Patient did not smoke on day of surgery  Induction- intravenous  Postoperative Plan-     Informed Consent- Anesthetic plan and risks discussed with patient

## 2019-11-05 VITALS
DIASTOLIC BLOOD PRESSURE: 68 MMHG | HEIGHT: 73 IN | RESPIRATION RATE: 20 BRPM | SYSTOLIC BLOOD PRESSURE: 112 MMHG | TEMPERATURE: 98.5 F | OXYGEN SATURATION: 97 % | WEIGHT: 315 LBS | BODY MASS INDEX: 41.75 KG/M2 | HEART RATE: 66 BPM

## 2019-11-05 LAB
ANION GAP SERPL CALCULATED.3IONS-SCNC: 8 MMOL/L (ref 4–13)
BUN SERPL-MCNC: 13 MG/DL (ref 5–25)
CALCIUM SERPL-MCNC: 9.2 MG/DL (ref 8.3–10.1)
CHLORIDE SERPL-SCNC: 103 MMOL/L (ref 100–108)
CO2 SERPL-SCNC: 25 MMOL/L (ref 21–32)
CREAT SERPL-MCNC: 1.2 MG/DL (ref 0.6–1.3)
ERYTHROCYTE [DISTWIDTH] IN BLOOD BY AUTOMATED COUNT: 13.5 % (ref 11.6–15.1)
GFR SERPL CREATININE-BSD FRML MDRD: 73 ML/MIN/1.73SQ M
GLUCOSE SERPL-MCNC: 109 MG/DL (ref 65–140)
HCT VFR BLD AUTO: 35.6 % (ref 36.5–49.3)
HGB BLD-MCNC: 12.7 G/DL (ref 12–17)
MCH RBC QN AUTO: 28.4 PG (ref 26.8–34.3)
MCHC RBC AUTO-ENTMCNC: 35.7 G/DL (ref 31.4–37.4)
MCV RBC AUTO: 80 FL (ref 82–98)
PLATELET # BLD AUTO: 196 THOUSANDS/UL (ref 149–390)
PMV BLD AUTO: 10.6 FL (ref 8.9–12.7)
POTASSIUM SERPL-SCNC: 4 MMOL/L (ref 3.5–5.3)
RBC # BLD AUTO: 4.47 MILLION/UL (ref 3.88–5.62)
SODIUM SERPL-SCNC: 136 MMOL/L (ref 136–145)
WBC # BLD AUTO: 10.3 THOUSAND/UL (ref 4.31–10.16)

## 2019-11-05 PROCEDURE — 99024 POSTOP FOLLOW-UP VISIT: CPT | Performed by: PHYSICIAN ASSISTANT

## 2019-11-05 PROCEDURE — 85027 COMPLETE CBC AUTOMATED: CPT | Performed by: SURGERY

## 2019-11-05 PROCEDURE — 80048 BASIC METABOLIC PNL TOTAL CA: CPT | Performed by: SURGERY

## 2019-11-05 PROCEDURE — NC001 PR NO CHARGE: Performed by: PHYSICIAN ASSISTANT

## 2019-11-05 RX ORDER — ACETAMINOPHEN 325 MG/1
975 TABLET ORAL EVERY 8 HOURS SCHEDULED
Qty: 30 TABLET | Refills: 0 | Status: SHIPPED | OUTPATIENT
Start: 2019-11-05

## 2019-11-05 RX ADMIN — FAMOTIDINE 20 MG: 10 INJECTION, SOLUTION INTRAVENOUS at 08:12

## 2019-11-05 RX ADMIN — SIMETHICONE CHEW TAB 80 MG 80 MG: 80 TABLET ORAL at 08:12

## 2019-11-05 RX ADMIN — KETOROLAC TROMETHAMINE 15 MG: 30 INJECTION, SOLUTION INTRAMUSCULAR at 05:44

## 2019-11-05 RX ADMIN — METRONIDAZOLE 500 MG: 500 INJECTION, SOLUTION INTRAVENOUS at 01:21

## 2019-11-05 RX ADMIN — KETOROLAC TROMETHAMINE 15 MG: 30 INJECTION, SOLUTION INTRAMUSCULAR at 00:11

## 2019-11-05 RX ADMIN — ACETAMINOPHEN 975 MG: 325 TABLET ORAL at 05:45

## 2019-11-05 RX ADMIN — CEFAZOLIN SODIUM 2000 MG: 2 SOLUTION INTRAVENOUS at 00:13

## 2019-11-05 NOTE — DISCHARGE SUMMARY
Discharge Summary - Luis Miguel Ross 40 y o  male MRN: [id number]    Unit/Bed#: E5 -01 Encounter: [id number]      Pre-Operative Diagnosis: Pre-Op Diagnosis Codes:     * Morbid obesity ([facility] [address] ) [E66 01]     * Sleep apnea, unspecified type [G47 30]    Post-Operative Diagnosis: Post-Op Diagnosis Codes:     * Morbid obesity ([facility] [address] ) [E66 01]     * Sleep apnea, unspecified type [G47 30]    Procedures Performed:  Procedure(s):  LAP CORRIE-EN-Y GASTRIC BYPASS, INTRAOP EGD    Surgeon: Corinne Pitt, MD    See H & P for full details of admission and Operative Note for full details of operations performed  Patient tolerated surgery well without complications  In the morning postoperative Day 1, the patient had mild nausea and abdominal pain  Tolerated a clear liquid diet without vomiting  Able to ambulate and voiding independently  Patient was deemed ready for discharge home  Patient was seen and examined prior to discharge  Provisions for Follow-Up Care:  See After Visit Summary/Discharge Instructions for information related to follow-up care and home orders  Disposition: Home, in stable condition  Planned Readmission: No    Discharge Medications:  See After Visit Summary/Discharge Instructions for reconciled discharge medications provided to patient and family  Post Operative instructions: Reviewed with patient and/or family      Signature:   Filemon Fregoso PA-C  Date: 11/5/2019 Time: 11:25 AM

## 2019-11-05 NOTE — DISCHARGE INSTR - AVS FIRST PAGE
your pain medications from [address] in [address]   Take Tylenol as instructed  Stay hydrated and follow your discharge diet progression   Mild nausea is ok as long as you can drink fluids  Take your omeprazole daily  Crush or cut your pills and open capsules, mix with liquid to drink    Follow up with Dr Osiel Zapata and your PCP within the next week

## 2019-11-05 NOTE — DISCHARGE INSTRUCTIONS
Bariatric/Weight Loss Surgery  Hospital Discharge Instructions  1  ACTIVITY:  a  Progress as feels comfortable - a good rule is:  if you are doing something and it begins to hurt, stop doing the activity  Walk every hour while at home  b  Omaira Valenzuela may walk stairs if you do so slowly  c  You may shower 48 hours after surgery  d  Use your incentive spirometer 10 times per hour while awake for 1 week  e  Do NOT drive for 48 hours after surgery  No driving 24 hours after taking certain prescription pain medications   Examples of such medication are Percocet, Darvocet, Oxycodone, Tylenol #3, and Tylenol with Codeine  Follow your pharmacists orders  2  DIET  a  Stay on a liquid diet for 7 days after your surgery date, sipping slowly  Refer to your manual for examples of choices  Remember to keep your liquids sugar free or low calorie  You may have protein drinks  Make sure to drink 48 to 64 ounces per day of fluids  b  Omaira Valenzuela may advance to a pureed diet one week after surgery as instructed by your diet progression pamphlet  Once you get approval from your surgeon at your first post operative visit you may advance to the soft diet  3  MEDICATIONS:  a  The abdominal nerve block will wear off during the first 1-2 days that you are home, and you may become sore  Continue to take your Tylenol and your pain medication as instructed  b  Start vitamins and minerals when you get home  c  Anti-acid Medication as per prescription  d  Other medications as indicated on the Physician Patient Discharge Instructions form given to you at the time of discharge  e  Make sure that you are splitting your pill or tablet medications in halves or fourths or even crushing them before you take them  Capsules should be opened and mixed with water or jello  You need to do this for at least 4 weeks after surgery  Eventually you will be able to take your medications the regular way as they were prescribed     f  Omaira Valenzuela will need to consult with your Family Doctor in regards to all your prescribed medication, particularly those for blood pressure and diabetes  As you lose weight, medical conditions may change, requiring an alteration or elimination of the drug dose  4  INCISION CARE  a  You may shower and get incisions wet 2 days after surgery  No soaking tub baths or swimming for 30 days after surgery  Keep abdominal area and incisions clean  Use soap and water to create a good lather and rinse off  Do not scrub incisions  b  If you have a drain, empty the drain as the nurses instructed  5  FOLLOW-UP APPOINTMENT should be made for one week after discharge  Call surgeons office at [phone] to schedule an appointment      6  CALL YOUR DOCTOR FOR:  pain not controlled by pain medications, a temperature greater than 101 5° F, any increase or change in drainage or redness from any incision, any vomiting or inability to keep liquids down, shortness of breath, shoulder pain, or bleeding

## 2019-11-05 NOTE — PROGRESS NOTES
Progress Note - Bariatric Surgery   Luis Miguel Ross 40 y o  male MRN: [id number]  Unit/Bed#: E5 -01 Encounter: [id number]      Subjective/Objective     Subjective: Patient with morbid obesity s/p LAPAROSCOPIC CORRIE-EN-Y GASTRIC BYPASS AND INTRAOPERATIVE EGD 11/4/2019 with Dr David Reinoso  Tolerating liquid diet without nausea or vomiting, pain adequately controlled on oral pain medication, ambulating without assistance, voiding well, using incentive spirometer  Denies fevers, chills, sweats, SOB, CP, calf pain  Objective:    /68 (BP Location: Right arm)   Pulse 66   Temp 98 5 °F (36 9 °C) (Temporal)   Resp 20   Ht 6' 1" (1 854 m)   Wt (!) 159 kg (350 lb 15 6 oz)   SpO2 97%   BMI 46 31 kg/m²       Intake/Output Summary (Last 24 hours) at 11/5/2019 1014  Last data filed at 11/5/2019 0900  Gross per 24 hour   Intake 1500 ml   Output 50 ml   Net 1450 ml       Invasive Devices     Peripheral Intravenous Line            Peripheral IV 11/04/19 Left Hand 1 day                ROS: 10-point system completed  All negative except see HPI  Physical Exam    General Appearance:    Alert, cooperative, no distress, appears stated age   Head:    Normocephalic, without obvious abnormality, atraumatic   Lungs:     Respirations unlabored   Heart:    Regular rate and rhythm   Abdomen:     Soft, appropriate tenderness,  no masses, no organomegaly,   non distended   Extremities:   Extremities normal, atraumatic, no cyanosis or edema   Neurologic:  Incision:  Psych:   Normal strength and sensation    Clean, dry, and intact    Normal mood and affect       Lab, Imaging and other studies:  I have personally reviewed pertinent lab results    , CBC:   Lab Results   Component Value Date    WBC 10 30 (H) 11/05/2019    HGB 12 7 11/05/2019    HCT 35 6 (L) 11/05/2019    MCV 80 (L) 11/05/2019     11/05/2019    MCH 28 4 11/05/2019    MCHC 35 7 11/05/2019    RDW 13 5 11/05/2019    MPV 10 6 11/05/2019   , CMP:   Lab Results Component Value Date    SODIUM 136 11/05/2019    K 4 0 11/05/2019     11/05/2019    CO2 25 11/05/2019    BUN 13 11/05/2019    CREATININE 1 20 11/05/2019    CALCIUM 9 2 11/05/2019    EGFR 73 11/05/2019        VTE Mechanical Prophylaxis: sequential compression device    Assessment/Plan  1)  Morbid Obesity s/p LAPAROSCOPIC CORRIE-EN-Y GASTRIC BYPASS AND INTRAOPERATIVE EGD 11/4/2019 with stable post op course  Patient afebrile and hemodynamically stable  Encourage PO fluids, ambulation, and incentive spirometry  Plan of care was discussed with patient and patient's nurse  Care plan discussed with Dr Stewart Griffith: Continue bariatric clear liquid diet, ambulation, incentive spirometry and if patient continues to improve clinically will discharge to home this afternoon

## 2019-11-05 NOTE — PLAN OF CARE
Problem: PAIN - ADULT  Goal: Verbalizes/displays adequate comfort level or baseline comfort level  Description  Interventions:  - Encourage patient to monitor pain and request assistance  - Assess pain using appropriate pain scale  - Administer analgesics based on type and severity of pain and evaluate response  - Implement non-pharmacological measures as appropriate and evaluate response  - Consider cultural and social influences on pain and pain management  - Notify physician/advanced practitioner if interventions unsuccessful or patient reports new pain  Outcome: Progressing     Problem: INFECTION - ADULT  Goal: Absence or prevention of progression during hospitalization  Description  INTERVENTIONS:  - Assess and monitor for signs and symptoms of infection  - Monitor lab/diagnostic results  - Monitor all insertion sites, i e  indwelling lines, tubes, and drains  - Monitor endotracheal if appropriate and nasal secretions for changes in amount and color  - [place] appropriate cooling/warming therapies per order  - Administer medications as ordered  - Instruct and encourage patient and family to use good hand hygiene technique  - Identify and instruct in appropriate isolation precautions for identified infection/condition  Outcome: Progressing  Goal: Absence of fever/infection during neutropenic period  Description  INTERVENTIONS:  - Monitor WBC    Outcome: Progressing     Problem: SAFETY ADULT  Goal: Patient will remain free of falls  Description  INTERVENTIONS:  - Assess patient frequently for physical needs  -  Identify cognitive and physical deficits and behaviors that affect risk of falls    -  [place] fall precautions as indicated by assessment   - Educate patient/family on patient safety including physical limitations  - Instruct patient to call for assistance with activity based on assessment  - Modify environment to reduce risk of injury  - Consider OT/PT consult to assist with strengthening/mobility  Outcome: Progressing  Goal: Maintain or return to baseline ADL function  Description  INTERVENTIONS:  -  Assess patient's ability to carry out ADLs; assess patient's baseline for ADL function and identify physical deficits which impact ability to perform ADLs (bathing, care of mouth/teeth, toileting, grooming, dressing, etc )  - Assess/evaluate cause of self-care deficits   - Assess range of motion  - Assess patient's mobility; develop plan if impaired  - Assess patient's need for assistive devices and provide as appropriate  - Encourage maximum independence but intervene and supervise when necessary  - Involve family in performance of ADLs  - Assess for home care needs following discharge   - Consider OT consult to assist with ADL evaluation and planning for discharge  - Provide patient education as appropriate  Outcome: Progressing  Goal: Maintain or return mobility status to optimal level  Description  INTERVENTIONS:  - Assess patient's baseline mobility status (ambulation, transfers, stairs, etc )    - Identify cognitive and physical deficits and behaviors that affect mobility  - Identify mobility aids required to assist with transfers and/or ambulation (gait belt, sit-to-stand, lift, walker, cane, etc )  - [place] fall precautions as indicated by assessment  - Record patient progress and toleration of activity level on Mobility SBAR; progress patient to next Phase/Stage  - Instruct patient to call for assistance with activity based on assessment  - Consider rehabilitation consult to assist with strengthening/weightbearing, etc   Outcome: Progressing     Problem: DISCHARGE PLANNING  Goal: Discharge to home or other facility with appropriate resources  Description  INTERVENTIONS:  - Identify barriers to discharge w/patient and caregiver  - Arrange for needed discharge resources and transportation as appropriate  - Identify discharge learning needs (meds, wound care, etc )  - Arrange for interpretive services to assist at discharge as needed  - Refer to Case Management Department for coordinating discharge planning if the patient needs post-hospital services based on physician/advanced practitioner order or complex needs related to functional status, cognitive ability, or social support system  Outcome: Progressing     Problem: Knowledge Deficit  Goal: Patient/family/caregiver demonstrates understanding of disease process, treatment plan, medications, and discharge instructions  Description  Complete learning assessment and assess knowledge base    Interventions:  - Provide teaching at level of understanding  - Provide teaching via preferred learning methods  Outcome: Progressing

## 2019-11-05 NOTE — UTILIZATION REVIEW
Initial Clinical Review    Elective   IP    surgical procedure    Age/Sex: 40 y o  male     Surgery Date:    11/4/2019    1  Procedure: Laparoscopic Jaquelin-en-Y Gastric Bypass  Intraoperative Endoscopy    Anesthesia: general    Operative Findings:   Normal anatomy    Admission Orders: Date/Time/Statement: Inpatient Admission Orders (From admission, onward)     Ordered        11/04/19 1136  Inpatient Admission  Once                   Orders Placed This Encounter   Procedures    Inpatient Admission     Standing Status:   Standing     Number of Occurrences:   1     Order Specific Question:   Admitting Physician     Answer:   Melissa Gomez     Order Specific Question:   Level of Care     Answer:   Med Surg [16]     Order Specific Question:   Bed Type     Answer:   Bariatric [1]     Order Specific Question:   Estimated length of stay     Answer:   One Midnight     Vital Signs: /68 (BP Location: Right arm)   Pulse 66   Temp 98 5 °F (36 9 °C) (Temporal)   Resp 20   Ht 6' 1" (1 854 m)   Wt (!) 159 kg (350 lb 15 6 oz)   SpO2 97%   BMI 46 31 kg/m²        Diet:   Bariatric  Cl liq  diet    Mobility:   OOB    DVT Prophylaxis:   SCD'S    Medications/Pain Control:   Scheduled Medications:    Medications:  acetaminophen 975 mg Oral Q8H [address]   famotidine 20 mg Intravenous BID   simethicone 80 mg Oral Q12H [address]     Continuous IV Infusions:    lactated ringers 75 mL/hr Intravenous Continuous     PRN Meds:    HYDROmorphone 1 mg Intravenous Q4H PRN   metoclopramide 10 mg Intravenous Q6H PRN   ondansetron 4 mg Intravenous Q4H PRN   oxyCODONE 10 mg Oral Q4H PRN   oxyCODONE 5 mg Oral Q4H PRN   phenol 2 spray Mouth/Throat Q2H PRN   promethazine 25 mg Intravenous Q4H PRN       Network Utilization Review Department  [email or web address]  ATTENTION: Please call with any questions or concerns to [phone] and carefully listen to the prompts so that you are directed to the right person   All voicemails are confidential   [facility] all requests for admission clinical reviews, approved or denied determinations and any other requests to dedicated fax number below belonging to the campus where the patient is receiving treatment    FACILITY NAME UR FAX NUMBER   ADMISSION DENIALS (Administrative/Medical Necessity) [address] [facility] (Maternity/NICU/Pediatrics) [phone]   [facility] [address] [place] [phone]   [address] [facility] [address] [facility] [phone]   [facility] [address] [facility] [address] [place] [address] [phone]

## 2019-11-05 NOTE — NURSING NOTE
Reviewed discharge instructions with patient and wife  Discussed follow up appts and home medications  Patient demonstrated understanding  Will continue to monitor until discharge

## 2019-11-06 ENCOUNTER — TRANSITIONAL CARE MANAGEMENT (OUTPATIENT)
Dept: FAMILY MEDICINE CLINIC | Facility: CLINIC | Age: 44
End: 2019-11-06

## 2019-11-06 ENCOUNTER — TELEPHONE (OUTPATIENT)
Dept: BARIATRICS | Facility: CLINIC | Age: 44
End: 2019-11-06

## 2019-11-06 NOTE — TELEPHONE ENCOUNTER
Post op follow up phone call completed  Pt is sipping liquids, using IS as instructed, reinforced importance of using IS to help prevent pneumonia  Ambulating about home without difficulty  Minimal pain, not using Oxycodone  Reaffirmed examples of liquid diet over the next week  Pt states that he had dark bowel movement this am   Pt states the stool made the water look red  Informed pt that there can be bleeding along staple line and blood will move through digestive track and turn darker in color until expelled  Instructed pt to report any increased amount of bloody stool, increased heart rate, increased shortness of breath, any dizziness or syncopal episodes  Pt states he feels fine but just concerned as he was not expecting to see this  Pt stated understanding about discharge instructions and medication adjustments  Follow up appt with surgeon scheduled for next week  Instructed to call with any additional questions or concerns

## 2019-11-08 ENCOUNTER — TELEPHONE (OUTPATIENT)
Dept: BARIATRICS | Facility: CLINIC | Age: 44
End: 2019-11-08

## 2019-11-08 NOTE — TELEPHONE ENCOUNTER
Pt calling to inform of 2nd dark maroon bowel movement  Pt states stool was very dark in color and made toilet water around stool red  Informed patient that after surgery there can be oozing of the staple lines and this blood will travel down the GI tract and become darker in color and be dark maroon in color when expelled  Pt feels fine, no shortness of breath, no rapid heart rate, no dizziness  States he feels good, has been up walking and has driven to bus stop to  children  I instructed patient to continue to monitor but that he may have 1 or 2 more dark bowel movements but they should start to turn more brown  Also instructed patient to call back with any increase in number of dark stools, shortness of breath, dizziness, or increased heart rate  Pt stated understanding and will comply

## 2019-11-14 ENCOUNTER — OFFICE VISIT (OUTPATIENT)
Dept: BARIATRICS | Facility: CLINIC | Age: 44
End: 2019-11-14

## 2019-11-14 VITALS
HEIGHT: 73 IN | TEMPERATURE: 97.2 F | WEIGHT: 315 LBS | SYSTOLIC BLOOD PRESSURE: 132 MMHG | BODY MASS INDEX: 41.75 KG/M2 | HEART RATE: 98 BPM | DIASTOLIC BLOOD PRESSURE: 84 MMHG | RESPIRATION RATE: 18 BRPM

## 2019-11-14 DIAGNOSIS — Z98.84 BARIATRIC SURGERY STATUS: Primary | ICD-10-CM

## 2019-11-14 DIAGNOSIS — E66.01 OBESITY, CLASS III, BMI 40-49.9 (MORBID OBESITY) (HCC): Primary | ICD-10-CM

## 2019-11-14 PROCEDURE — RECHECK: Performed by: DIETITIAN, REGISTERED

## 2019-11-14 PROCEDURE — 99024 POSTOP FOLLOW-UP VISIT: CPT | Performed by: SURGERY

## 2019-11-14 RX ORDER — MULTIVIT-MIN/IRON FUM/FOLIC AC 7.5 MG-4
1 TABLET ORAL DAILY
COMMUNITY

## 2019-11-14 RX ORDER — IBUPROFEN 200 MG
1 CAPSULE ORAL DAILY
COMMUNITY

## 2019-11-14 NOTE — PROGRESS NOTES
Weight Management Nutrition Class     Diagnosis: Obesity    Bariatric Surgeon: Dr Apple Aviles    Surgery: Gastric Bypass Laparoscopic    Class: first post op note    Topics discussed today include:     fluid goals post op, protein goals post op, constipation, chew food well, exercise, diet progression, protein supplems, vitamin/mineral supplements and calcium supplements    Patient was able to verbalize basic diet (protein, fluid, vitamin and mineral) recommendations and possible nutrition-related complications   Yes patient's condition

## 2019-11-14 NOTE — PROGRESS NOTES
POST OP UP VISIT - BARIATRIC SURGERY  Ta Mckeon 40 y o  male MRN: [id number]  Unit/Bed#:  Encounter: [id number]      HPI:  Boris Vernon is a 40 y o  male status post gastric bypass here for first postop appointment  Review of Systems    Historical Information   Past Medical History:   Diagnosis Date    Chronic kidney disease     CPAP (continuous positive airway pressure) dependence     Morbid obesity (HCC)     Sleep apnea     Snoring     Wears glasses      Past Surgical History:   Procedure Laterality Date    NV EGD TRANSORAL BIOPSY SINGLE/MULTIPLE N/A 5/16/2018    Procedure: ESOPHAGOGASTRODUODENOSCOPY (EGD) with bx;  Surgeon: Cira Poole MD;  Location: [facility]; Service: Bariatrics    NV LAP GASTRIC BYPASS/CORRIE-EN-Y N/A 11/4/2019    Procedure: LAP CORRIE-EN-Y GASTRIC BYPASS, INTRAOP EGD;  Surgeon: Cira Poole MD;  Location: [facility];  Service: [address] EXTRACTION       Social History   Social History     Substance and Sexual Activity   Alcohol Use Not Currently    Comment: rare social     Social History     Substance and Sexual Activity   Drug Use No     Social History     Tobacco Use   Smoking Status Never Smoker   Smokeless Tobacco Never Used     Family History: non-contributory    Meds/Allergies   all medications and allergies reviewed  No Known Allergies    Objective       Current Vitals:   Blood Pressure: 132/84 (11/14/19 0951)  Pulse: 98 (11/14/19 0951)  Temperature: (!) 97 2 °F (36 2 °C) (11/14/19 0951)  Respirations: 18 (11/14/19 0951)  Height: 6' 1" (185 4 cm) (11/14/19 0951)  Weight - Scale: (!) 152 kg (336 lb) (11/14/19 0951)      Invasive Devices     None                 Physical Exam   Constitutional: He is oriented to person, place, and time  He appears well-developed  No distress  Abdominal: Soft  Bowel sounds are normal  He exhibits no distension and no mass  There is no tenderness  There is no rebound and no guarding  Abdomen is obese   Benign to examination  Incisions are healing well with no evidence of infection  Mild ecchymosis on the left flank incision  Neurological: He is alert and oriented to person, place, and time  Psychiatric: He has a normal mood and affect  His behavior is normal  Judgment and thought content normal    Vitals reviewed  Assessment/PLAN:    40 y o  male status post laparoscopic Jaquelin-en-Y gastric bypass on 11/4/2019  Doing well post op  No major issues  Increase physical activity as tolerated and as instructed  Advance diet as instructed by our dietitians today and as indicated in the binder  Follow up in one month as scheduled              Berry Jarrett MD  11/14/2019  10:02 AM

## 2019-11-18 ENCOUNTER — TELEPHONE (OUTPATIENT)
Dept: NEPHROLOGY | Facility: CLINIC | Age: 44
End: 2019-11-18

## 2019-11-18 NOTE — TELEPHONE ENCOUNTER
Left message for patient to call the office to schedule FU appointment with Dr Robbie Villalobos in the [facility]  Patient on recall for February but okay to come in January due to availability

## 2019-12-02 ENCOUNTER — TELEPHONE (OUTPATIENT)
Dept: BARIATRICS | Facility: CLINIC | Age: 44
End: 2019-12-02

## 2020-02-17 ENCOUNTER — TELEPHONE (OUTPATIENT)
Dept: NEPHROLOGY | Facility: CLINIC | Age: 45
End: 2020-02-17

## 2020-02-17 NOTE — TELEPHONE ENCOUNTER
Left message for patient to remind him of his appointment on 2/19 with Dr Nery Ac in [address] [place] [address], and there is blood work to be done for the appointment

## 2020-03-09 ENCOUNTER — TELEPHONE (OUTPATIENT)
Dept: NEPHROLOGY | Facility: HOSPITAL | Age: 45
End: 2020-03-09

## 2020-03-09 NOTE — TELEPHONE ENCOUNTER
Left message for patient reminding him of appointment on 3/10 and also there is blood work to be done for appointment

## 2020-03-10 ENCOUNTER — OFFICE VISIT (OUTPATIENT)
Dept: NEPHROLOGY | Facility: CLINIC | Age: 45
End: 2020-03-10
Payer: COMMERCIAL

## 2020-03-10 VITALS
BODY MASS INDEX: 39.36 KG/M2 | DIASTOLIC BLOOD PRESSURE: 80 MMHG | WEIGHT: 297 LBS | HEART RATE: 82 BPM | HEIGHT: 73 IN | SYSTOLIC BLOOD PRESSURE: 122 MMHG

## 2020-03-10 DIAGNOSIS — E66.01 MORBID OBESITY (HCC): ICD-10-CM

## 2020-03-10 DIAGNOSIS — N18.2 CKD (CHRONIC KIDNEY DISEASE), STAGE II: Primary | ICD-10-CM

## 2020-03-10 PROCEDURE — 3008F BODY MASS INDEX DOCD: CPT | Performed by: INTERNAL MEDICINE

## 2020-03-10 PROCEDURE — 1036F TOBACCO NON-USER: CPT | Performed by: INTERNAL MEDICINE

## 2020-03-10 PROCEDURE — 99213 OFFICE O/P EST LOW 20 MIN: CPT | Performed by: INTERNAL MEDICINE

## 2020-03-10 NOTE — PROGRESS NOTES
NEPHROLOGY OUTPATIENT PROGRESS NOTE   Sal Izaguirre 40 y o  male MRN: [id number]  Reason for visit:  CKD 2    ASSESSMENT and PLAN:  1  Previous CKD 2, with associated microalbuminuria, most recent protein creatinine ratio normal, creatinine stable at 1 2 estimated GFR greater than 60  2  Morbid obesity status post gastric bypass surgery, has lost approximately 80 lb and has continued to decrease in weight  3  History of borderline hypertension, blood pressure appears stable/under good control    · Overall patient has had significant weight loss since bypass surgery  · Given history of proteinuria possible CKD 2, repeat renal function panel, microalbumin to creatinine ratio as well as repeat urinalysis  · Assuming repeat laboratory studies are normal, does not need further follow-up  · No other changes in his regimen, recommendations will be based upon the above laboratory studies    SUBJECTIVE / INTERVAL HISTORY:  We had the pleasure of seeing Marquette Sacks for nephrology follow-up secondary to reported/possible CKD 2 with associated proteinuria  Since our last visit he has under gone gastric bypass and has lost approximately 80 lb  He has been feeling well  Increasing energy  Has started to go to the gym a few weeks back  Denies any chest pain shortness of breath or swelling  His appetite has been stable  OBJECTIVE:  /80 (BP Location: Left arm, Patient Position: Sitting, Cuff Size: Large)   Pulse 82   Ht 6' 1" (1 854 m)   Wt 135 kg (297 lb)   BMI 39 18 kg/m²   Vitals:    03/10/20 1551   Weight: 135 kg (297 lb)       Physical Exam   Constitutional: He is oriented to person, place, and time  No distress  HENT:   Head: Normocephalic  Eyes: No scleral icterus  Cardiovascular: Normal rate and regular rhythm  Pulmonary/Chest: Effort normal and breath sounds normal    Abdominal: Soft  Musculoskeletal: He exhibits no edema  Neurological: He is alert and oriented to person, place, and time     Skin: Skin is warm and dry  No rash noted  Psychiatric: He has a normal mood and affect           Medications:    Current Outpatient Medications:     acetaminophen (TYLENOL) 325 mg tablet, Take 3 tablets (975 mg total) by mouth every 8 (eight) hours, Disp: 30 tablet, Rfl: 0    calcium citrate (CALCITRATE) 950 MG tablet, Take 1 tablet by mouth daily, Disp: , Rfl:     Multiple Vitamin (MULTIVITAMIN) tablet, Take 1 tablet by mouth daily, Disp: , Rfl:     Multiple Vitamins-Minerals (MULTIVITAMIN WITH MINERALS) tablet, Take 1 tablet by mouth daily, Disp: , Rfl:     omeprazole (PriLOSEC) 20 mg delayed release capsule, Take 1 capsule (20 mg total) by mouth daily, Disp: 90 capsule, Rfl: 3    oxyCODONE (ROXICODONE) 5 mg immediate release tablet, Take 1 tablet (5 mg total) by mouth every 4 (four) hours as needed for moderate pain For continuing therapyMax Daily Amount: 30 mg, Disp: 15 tablet, Rfl: 0    Laboratory Results:  Results for orders placed or performed during the hospital encounter of 67/76/08   Basic metabolic panel   Result Value Ref Range    Sodium 136 136 - 145 mmol/L    Potassium 4 0 3 5 - 5 3 mmol/L    Chloride 103 100 - 108 mmol/L    CO2 25 21 - 32 mmol/L    ANION GAP 8 4 - 13 mmol/L    BUN 13 5 - 25 mg/dL    Creatinine 1 20 0 60 - 1 30 mg/dL    Glucose 109 65 - 140 mg/dL    Calcium 9 2 8 3 - 10 1 mg/dL    eGFR 73 ml/min/1 73sq m   CBC (With Platelets)   Result Value Ref Range    WBC 10 30 (H) 4 31 - 10 16 Thousand/uL    RBC 4 47 3 88 - 5 62 Million/uL    Hemoglobin 12 7 12 0 - 17 0 g/dL    Hematocrit 35 6 (L) 36 5 - 49 3 %    MCV 80 (L) 82 - 98 fL    MCH 28 4 26 8 - 34 3 pg    MCHC 35 7 31 4 - 37 4 g/dL    RDW 13 5 11 6 - 15 1 %    Platelets 118 269 - 893 Thousands/uL    MPV 10 6 8 9 - 12 7 fL

## 2020-03-10 NOTE — LETTER
March 10, 2020     Valeria Howard, [address]    Patient: Julian Camarena   YOB: 1975   Date of Visit: 3/10/2020       Dear Dr Wolf Postin:    Thank you for referring Julian Camarena to me for evaluation  Below are the relevant portions of my assessment and plan of care  If you have questions, please do not hesitate to call me  I look forward to following Ta along with you  Sincerely,        Kaykay Cabrera,         CC: No Recipients                         ASSESSMENT and PLAN:  1  Previous CKD 2, with associated microalbuminuria, most recent protein creatinine ratio normal, creatinine stable at 1 2 estimated GFR greater than 60  2  Morbid obesity status post gastric bypass surgery, has lost approximately 80 lb and has continued to decrease in weight  3  History of borderline hypertension, blood pressure appears stable/under good control    · Overall patient has had significant weight loss since bypass surgery  · Given history of proteinuria possible CKD 2, repeat renal function panel, microalbumin to creatinine ratio as well as repeat urinalysis  · Assuming repeat laboratory studies are normal, does not need further follow-up    · No other changes in his regimen, recommendations will be based upon the above laboratory studies

## 2020-05-11 ENCOUNTER — TELEPHONE (OUTPATIENT)
Dept: BARIATRICS | Facility: CLINIC | Age: 45
End: 2020-05-11

## 2020-05-19 ENCOUNTER — TELEPHONE (OUTPATIENT)
Dept: BARIATRICS | Facility: CLINIC | Age: 45
End: 2020-05-19

## 2020-05-19 ENCOUNTER — TELEMEDICINE (OUTPATIENT)
Dept: BARIATRICS | Facility: CLINIC | Age: 45
End: 2020-05-19
Payer: COMMERCIAL

## 2020-05-19 VITALS — HEIGHT: 73 IN | BODY MASS INDEX: 35.65 KG/M2 | WEIGHT: 269 LBS

## 2020-05-19 DIAGNOSIS — E66.9 OBESITY, CLASS II, BMI 35-39.9: Primary | ICD-10-CM

## 2020-05-19 DIAGNOSIS — E78.6 LOW HDL (UNDER 40): ICD-10-CM

## 2020-05-19 DIAGNOSIS — K91.2 POSTSURGICAL MALABSORPTION: ICD-10-CM

## 2020-05-19 DIAGNOSIS — G47.33 OSA (OBSTRUCTIVE SLEEP APNEA): ICD-10-CM

## 2020-05-19 DIAGNOSIS — Z48.815 ENCOUNTER FOR SURGICAL AFTERCARE FOLLOWING SURGERY OF DIGESTIVE SYSTEM: ICD-10-CM

## 2020-05-19 DIAGNOSIS — R73.01 IMPAIRED FASTING GLUCOSE: ICD-10-CM

## 2020-05-19 PROBLEM — E66.812 OBESITY, CLASS II, BMI 35-39.9: Status: ACTIVE | Noted: 2020-05-19

## 2020-05-19 PROBLEM — E66.01 MORBID (SEVERE) OBESITY DUE TO EXCESS CALORIES (HCC): Status: RESOLVED | Noted: 2018-04-05 | Resolved: 2020-05-19

## 2020-05-19 PROBLEM — E66.01 OBESITY, CLASS III, BMI 40-49.9 (MORBID OBESITY) (HCC): Status: RESOLVED | Noted: 2019-10-17 | Resolved: 2020-05-19

## 2020-05-19 PROBLEM — E66.01 MORBID OBESITY (HCC): Status: RESOLVED | Noted: 2019-10-09 | Resolved: 2020-05-19

## 2020-05-19 PROBLEM — E66.813 OBESITY, CLASS III, BMI 40-49.9 (MORBID OBESITY): Status: RESOLVED | Noted: 2019-10-17 | Resolved: 2020-05-19

## 2020-05-19 PROCEDURE — 99214 OFFICE O/P EST MOD 30 MIN: CPT | Performed by: PHYSICIAN ASSISTANT

## 2021-01-24 DIAGNOSIS — Z23 ENCOUNTER FOR IMMUNIZATION: ICD-10-CM

## 2021-02-06 ENCOUNTER — IMMUNIZATIONS (OUTPATIENT)
Dept: FAMILY MEDICINE CLINIC | Facility: HOSPITAL | Age: 46
End: 2021-02-06

## 2021-02-06 DIAGNOSIS — Z23 ENCOUNTER FOR IMMUNIZATION: Primary | ICD-10-CM

## 2021-02-06 PROCEDURE — 91301 SARS-COV-2 / COVID-19 MRNA VACCINE (MODERNA) 100 MCG: CPT

## 2021-02-06 PROCEDURE — 0011A SARS-COV-2 / COVID-19 MRNA VACCINE (MODERNA) 100 MCG: CPT

## 2021-03-08 ENCOUNTER — IMMUNIZATIONS (OUTPATIENT)
Dept: FAMILY MEDICINE CLINIC | Facility: HOSPITAL | Age: 46
End: 2021-03-08

## 2021-03-08 DIAGNOSIS — Z23 ENCOUNTER FOR IMMUNIZATION: Primary | ICD-10-CM

## 2021-03-08 PROCEDURE — 91301 SARS-COV-2 / COVID-19 MRNA VACCINE (MODERNA) 100 MCG: CPT

## 2021-03-08 PROCEDURE — 0012A SARS-COV-2 / COVID-19 MRNA VACCINE (MODERNA) 100 MCG: CPT

## 2021-04-12 ENCOUNTER — TELEPHONE (OUTPATIENT)
Dept: BARIATRICS | Facility: CLINIC | Age: 46
End: 2021-04-12

## 2021-04-12 NOTE — TELEPHONE ENCOUNTER
Lm to schedule 1 yr f/u    ----- Message from Justino Andrade RN sent at 3/31/2021 11:46 AM EDT -----  Regardin year f/u appointment  Please contact patient to schedule a 1 year follow up appointment    Thank You

## 2022-02-09 PROBLEM — R79.89 ELEVATED SERUM CREATININE: Status: RESOLVED | Noted: 2018-09-18 | Resolved: 2022-02-09

## 2022-02-09 PROBLEM — G47.30 SLEEP APNEA: Status: RESOLVED | Noted: 2019-10-09 | Resolved: 2022-02-09

## 2022-02-09 PROBLEM — Z01.810 PREOPERATIVE CARDIOVASCULAR EXAMINATION: Status: RESOLVED | Noted: 2018-12-02 | Resolved: 2022-02-09

## 2022-04-05 ENCOUNTER — TELEPHONE (OUTPATIENT)
Dept: BARIATRICS | Facility: CLINIC | Age: 47
End: 2022-04-05

## 2022-04-05 NOTE — TELEPHONE ENCOUNTER
----- Message from Deacon Segundo RN sent at 2022  8:17 AM EDT -----  Regardin year f/u appointment  Please contact patient to schedule a 2 year follow up appointment    Thank You

## 2022-05-05 ENCOUNTER — OFFICE VISIT (OUTPATIENT)
Dept: FAMILY MEDICINE CLINIC | Facility: CLINIC | Age: 47
End: 2022-05-05
Payer: COMMERCIAL

## 2022-05-05 VITALS
OXYGEN SATURATION: 96 % | WEIGHT: 263 LBS | BODY MASS INDEX: 34.85 KG/M2 | HEIGHT: 73 IN | DIASTOLIC BLOOD PRESSURE: 76 MMHG | SYSTOLIC BLOOD PRESSURE: 120 MMHG | HEART RATE: 54 BPM

## 2022-05-05 DIAGNOSIS — Z12.5 SCREENING FOR PROSTATE CANCER: ICD-10-CM

## 2022-05-05 DIAGNOSIS — K91.2 POSTSURGICAL MALABSORPTION: ICD-10-CM

## 2022-05-05 DIAGNOSIS — N52.9 ERECTILE DYSFUNCTION, UNSPECIFIED ERECTILE DYSFUNCTION TYPE: ICD-10-CM

## 2022-05-05 DIAGNOSIS — N52.9 ERECTILE DYSFUNCTION, UNSPECIFIED ERECTILE DYSFUNCTION TYPE: Primary | ICD-10-CM

## 2022-05-05 DIAGNOSIS — Z00.00 WELL ADULT EXAM: ICD-10-CM

## 2022-05-05 DIAGNOSIS — Z12.11 SCREENING FOR COLON CANCER: Primary | ICD-10-CM

## 2022-05-05 PROBLEM — E66.9 OBESITY, CLASS II, BMI 35-39.9: Status: RESOLVED | Noted: 2020-05-19 | Resolved: 2022-05-05

## 2022-05-05 PROBLEM — R03.0 PREHYPERTENSION: Status: RESOLVED | Noted: 2018-06-26 | Resolved: 2022-05-05

## 2022-05-05 PROBLEM — E66.812 OBESITY, CLASS II, BMI 35-39.9: Status: RESOLVED | Noted: 2020-05-19 | Resolved: 2022-05-05

## 2022-05-05 PROCEDURE — 3725F SCREEN DEPRESSION PERFORMED: CPT | Performed by: FAMILY MEDICINE

## 2022-05-05 PROCEDURE — 99396 PREV VISIT EST AGE 40-64: CPT | Performed by: FAMILY MEDICINE

## 2022-05-05 PROCEDURE — 3008F BODY MASS INDEX DOCD: CPT | Performed by: FAMILY MEDICINE

## 2022-05-05 RX ORDER — SILDENAFIL 100 MG/1
100 TABLET, FILM COATED ORAL DAILY PRN
Qty: 10 TABLET | Refills: 3 | Status: SHIPPED | OUTPATIENT
Start: 2022-05-05

## 2022-05-05 NOTE — PROGRESS NOTES
Assessment/Plan     Healthy male exam      1    2  Patient Counseling:  --Nutrition: Stressed importance of moderation in sodium/caffeine intake, saturated fat and cholesterol, caloric balance, sufficient intake of fresh fruits, vegetables,fiber, plenty of water  --Exercise: Stressed the importance of regular exercise  --Substance Abuse:no concerns   --Sexuality: erectile dysfunction  --Injury prevention: seatbelts, use of personal protective  equipment  --Dental health: Discussed importance of regular tooth brushing, flossing, and dental visits  utd dental , utd eye exams  --Immunizations reviewed  needs tetanus shot  --Discussed benefits of screening colonoscopy  -will do cologuard   --After hours service discussed with patient    3  Discussed the patient's BMI with him  The BMI is above average; BMI management plan is completed  4  Follow up as needed for acute illness  David Camarena is a 55 y o  male and is here for a comprehensive physical exam  The patient reports problems - erectile dysfunction  Do you take any herbs or supplements that were not prescribed by a doctor? no  Are you taking calcium supplements? no  Are you taking aspirin daily? no     History:  Patient receives prostate care outside our clinic  Date last prostate exam: none  Date last PSA: none    The following portions of the patient's history were reviewed and updated as appropriate: allergies, current medications, past family history, past medical history, past social history, past surgical history and problem list   no concerns    Review of Systems  Do you have pain that bothers you in your daily life? no  ED      Objective     /76 (BP Location: Left arm, Patient Position: Sitting, Cuff Size: Large)   Pulse (!) 54   Ht 6' 1" (1 854 m)   Wt 119 kg (263 lb)   SpO2 96%   BMI 34 70 kg/m²     General Appearance:    Alert, cooperative, no distress, appears stated age   Head:    Normocephalic, without obvious abnormality, atraumatic   Eyes:    PERRL, conjunctiva/corneas clear, EOM's intact, fundi     benign, both eyes        Ears:    Normal TM's and external ear canals, both ears   Nose:   Nares normal, septum midline, mucosa normal, no drainage    or sinus tenderness   Throat:   Lips, mucosa, and tongue normal; teeth and gums normal   Neck:   Supple, symmetrical, trachea midline, no adenopathy;        thyroid:  No enlargement/tenderness/nodules; no carotid    bruit or JVD   Back:     Symmetric, no curvature, ROM normal, no CVA tenderness   Lungs:     Clear to auscultation bilaterally, respirations unlabored   Chest wall:    No tenderness or deformity   Heart:    Regular rate and rhythm, S1 and S2 normal, no murmur, rub   or gallop   Abdomen:     Soft, non-tender, bowel sounds active all four quadrants,     no masses, no organomegaly   Genitalia:    Normal male without lesion, discharge or tenderness   Rectal:    Normal tone, normal prostate, no masses or tenderness;    guaiac negative stool   Extremities:   Extremities normal, atraumatic, no cyanosis or edema   Pulses:   2+ and symmetric all extremities   Skin:   Skin color, texture, turgor normal, no rashes or lesions   Lymph nodes:   Cervical, supraclavicular, and axillary nodes normal   Neurologic:   CNII-XII intact  Normal strength, sensation and reflexes       throughout       Assessment and Plan:    Problem List Items Addressed This Visit        Digestive    Postsurgical malabsorption    Relevant Orders    Vitamin D 25 hydroxy    Vitamin B12    Iron Panel (Includes Ferritin, Iron Sat%, Iron, and TIBC)    Vitamin A    PTH, intact    Vitamin B1 (Thiamine), Serum/Plasma, LC/MS/MS    TSH, 3rd generation    Lipid panel    Comprehensive metabolic panel    CBC and differential      Other Visit Diagnoses     Screening for colon cancer    -  Primary    Relevant Orders    Cologuard    Well adult exam        Erectile dysfunction, unspecified erectile dysfunction type Relevant Orders    POCT ECG    Screening for prostate cancer        Relevant Orders    PSA, Total Screen    BMI 34 0-34 9,adult                     Diagnoses and all orders for this visit:    Screening for colon cancer  -     Cologuard    Well adult exam    Postsurgical malabsorption  -     Vitamin D 25 hydroxy; Future  -     Vitamin B12; Future  -     Iron Panel (Includes Ferritin, Iron Sat%, Iron, and TIBC); Future  -     Vitamin A; Future  -     PTH, intact; Future  -     Vitamin B1 (Thiamine), Serum/Plasma, LC/MS/MS; Future  -     TSH, 3rd generation; Future  -     Lipid panel; Future  -     Comprehensive metabolic panel; Future  -     CBC and differential; Future    Erectile dysfunction, unspecified erectile dysfunction type  -     POCT ECG    Screening for prostate cancer  -     PSA, Total Screen; Future    BMI 34 0-34 9,adult            Subjective:      Patient ID: Radhika Diaz is a 55 y o  male  CC:    Chief Complaint   Patient presents with    Physical Exam     Patient present today for his Annual Physical Exam  Patient will like to discuss with Dr Ginger Tran about ED  HPI:    HPI    The following portions of the patient's history were reviewed and updated as appropriate: allergies, current medications, past family history, past medical history, past social history, past surgical history and problem list       Review of Systems      Data to review:       Objective:    Vitals:    05/05/22 1257   BP: 120/76   BP Location: Left arm   Patient Position: Sitting   Cuff Size: Large   Pulse: (!) 54   SpO2: 96%   Weight: 119 kg (263 lb)   Height: 6' 1" (1 854 m)        Physical Exam        Body mass index is 34 7 kg/m²

## 2022-05-05 NOTE — PROGRESS NOTES
BMI Counseling: Body mass index is 34 7 kg/m²  The BMI is above normal  Nutrition recommendations include reducing portion sizes, decreasing overall calorie intake, 3-5 servings of fruits/vegetables daily, reducing fast food intake and consuming healthier snacks  Exercise recommendations include exercising 3-5 times per week

## 2022-05-05 NOTE — PATIENT INSTRUCTIONS
rec tetanus shot at pharmacy,await lab  Obesity   AMBULATORY CARE:   Obesity  means your body mass index (BMI) is greater than 30  Your healthcare provider will use your height and weight to measure your BMI  The risks of obesity include  many health problems, including injuries or physical disability  · Diabetes (high blood sugar level)    · High blood pressure or high cholesterol    · Heart disease    · Stroke    · Gallbladder or liver disease    · Cancer of the colon, breast, prostate, liver, or kidney    · Sleep apnea    · Arthritis or gout    Screening  is done to check for health conditions before you have signs or symptoms  If you are 28to 79years old, your blood sugar level may be checked every 3 years for signs of prediabetes or diabetes  Your healthcare provider will check your blood pressure at each visit  High blood pressure can lead to a stroke or other problems  Your provider may check for signs of heart disease, cancer, or other health problems  Seek care immediately if:   · You have a severe headache, confusion, or difficulty speaking  · You have weakness on one side of your body  · You have chest pain, sweating, or shortness of breath  Call your doctor if:   · You have symptoms of gallbladder or liver disease, such as pain in your upper abdomen  · You have knee or hip pain and discomfort while walking  · You have symptoms of diabetes, such as intense hunger and thirst, and frequent urination  · You have symptoms of sleep apnea, such as snoring or daytime sleepiness  · You have questions or concerns about your condition or care  Treatment for obesity  focuses on helping you lose weight to improve your health  Even a small decrease in BMI can reduce the risk for many health problems  Your healthcare provider will help you set a weight-loss goal   · Lifestyle changes  are the first step in treating obesity   These include making healthy food choices and getting regular physical activity  Your healthcare provider may suggest a weight-loss program that involves coaching, education, and therapy  · Medicine  may help you lose weight when it is used with a healthy foods and physical activity  · Surgery  can help you lose weight if you are very obese and have other health problems  There are several types of weight-loss surgery  Ask your healthcare provider for more information  Tips for safe weight loss:   · Set small, realistic goals  An example of a small goal is to walk for 20 minutes 5 days a week  Anther goal is to lose 5% of your body weight  · Tell friends, family members, and coworkers about your goals  and ask for their support  Ask a friend to lose weight with you, or join a weight-loss support group  · Identify foods or triggers that may cause you to overeat , and find ways to avoid them  Remove tempting high-calorie foods from your home and workplace  Place a bowl of fresh fruit on your kitchen counter  If stress causes you to eat, then find other ways to cope with stress  A counselor or therapist may be able to help you  · Keep a diary to track what you eat and drink  Also write down how many minutes of physical activity you do each day  Weigh yourself once a week and record it in your diary  Eating changes: You will need to eat 500 to 1,000 fewer calories each day than you currently eat to lose 1 to 2 pounds a week  The following changes will help you cut calories:  · Eat smaller portions  Use small plates, no larger than 9 inches in diameter  Fill your plate half full of fruits and vegetables  Measure your food using measuring cups until you know what a serving size looks like  · Eat 3 meals and 1 or 2 snacks each day  Plan your meals in advance  Dacia Chapman and eat at home most of the time  Eat slowly  Do not skip meals  Skipping meals can lead to overeating later in the day  This can make it harder for you to lose weight   Talk with a dietitian to help you make a meal plan and schedule that is right for you  · Eat fruits and vegetables at every meal   They are low in calories and high in fiber, which makes you feel full  Do not add butter, margarine, or cream sauce to vegetables  Use herbs to season steamed vegetables  · Eat less fat and fewer fried foods  Eat more baked or grilled chicken and fish  These protein sources are lower in calories and fat than red meat  Limit fast food  Dress your salads with olive oil and vinegar instead of bottled dressing  · Limit the amount of sugar you eat  Do not drink sugary beverages  Limit alcohol  Activity changes:  Physical activity is good for your body in many ways  It helps you burn calories and build strong muscles  It decreases stress and depression, and improves your mood  It can also help you sleep better  Talk to your healthcare provider before you begin an exercise program   · Exercise for at least 30 minutes 5 days a week  Start slowly  Set aside time each day for physical activity that you enjoy and that is convenient for you  It is best to do both weight training and an activity that increases your heart rate, such as walking, bicycling, or swimming  · Find ways to be more active  Do yard work and housecleaning  Walk up the stairs instead of using elevators  Spend your leisure time going to events that require walking, such as outdoor festivals or fairs  This extra physical activity can help you lose weight and keep it off  Follow up with your doctor as directed: You may need to meet with a dietitian  Write down your questions so you remember to ask them during your visits  © [employer] 2022 Information is for End User's use only and may not be sold, redistributed or otherwise used for commercial purposes   All illustrations and images included in CareNotes® are the copyrighted property of [employer] M [employer]  or Zora Aponte  The above information is an  only  It is not intended as medical advice for individual conditions or treatments  Talk to your doctor, nurse or pharmacist before following any medical regimen to see if it is safe and effective for you  Weight Management   AMBULATORY CARE:   Why it is important to manage your weight:  Being overweight increases your risk of health conditions such as heart disease, high blood pressure, type 2 diabetes, and certain types of cancer  It can also increase your risk for osteoarthritis, sleep apnea, and other respiratory problems  Aim for a slow, steady weight loss  Even a small amount of weight loss can lower your risk of health problems  Risks of being overweight:  Extra weight can cause many health problems, including the following:  · Diabetes (high blood sugar level)    · High blood pressure or high cholesterol    · Heart disease    · Stroke    · Gallbladder or liver disease    · Cancer of the colon, breast, prostate, liver, or kidney    · Sleep apnea    · Arthritis or gout    Screening  is done to check for health conditions before you have signs or symptoms  If you are 28to 79years old, your blood sugar level may be checked every 3 years for signs of prediabetes or diabetes  Your healthcare provider will check your blood pressure at each visit  High blood pressure can lead to a stroke or other problems  Your provider may check for signs of heart disease, cancer, or other health problems  How to lose weight safely:  A safe and healthy way to lose weight is to eat fewer calories and get regular exercise  · You can lose up about 1 pound a week by decreasing the number of calories you eat by 500 calories each day  You can decrease calories by eating smaller portion sizes or by cutting out high-calorie foods  Read labels to find out how many calories are in the foods you eat  · You can also burn calories with exercise such as walking, swimming, or biking   You will be more likely to keep weight off if you make these changes part of your lifestyle  Exercise at least 30 minutes per day on most days of the week  You can also fit in more physical activity by taking the stairs instead of the elevator or parking farther away from stores  Ask your healthcare provider about the best exercise plan for you  Healthy meal plan for weight management:  A healthy meal plan includes a variety of foods, contains fewer calories, and helps you stay healthy  A healthy meal plan includes the following:     · Eat whole-grain foods more often  A healthy meal plan should contain fiber  Fiber is the part of grains, fruits, and vegetables that is not broken down by your body  Whole-grain foods are healthy and provide extra fiber in your diet  Some examples of whole-grain foods are whole-wheat breads and pastas, oatmeal, brown rice, and bulgur  · Eat a variety of vegetables every day  Include dark, leafy greens such as spinach, kale, frantz greens, and mustard greens  Eat yellow and orange vegetables such as carrots, sweet potatoes, and winter squash  · Eat a variety of fruits every day  Choose fresh or canned fruit (canned in its own juice or light syrup) instead of juice  Fruit juice has very little or no fiber  · Eat low-fat dairy foods  Drink fat-free (skim) milk or 1% milk  Eat fat-free yogurt and low-fat cottage cheese  Try low-fat cheeses such as mozzarella and other reduced-fat cheeses  · Choose meat and other protein foods that are low in fat  Choose beans or other legumes such as split peas or lentils  Choose fish, skinless poultry (chicken or turkey), or lean cuts of red meat (beef or pork)  Before you cook meat or poultry, cut off any visible fat  · Use less fat and oil  Try baking foods instead of frying them  Add less fat, such as margarine, sour cream, regular salad dressing and mayonnaise to foods  Eat fewer high-fat foods   Some examples of high-fat foods include french fries, doughnuts, ice cream, and cakes  · Eat fewer sweets  Limit foods and drinks that are high in sugar  This includes candy, cookies, regular soda, and sweetened drinks  Ways to decrease calories:   · Eat smaller portions  ? Use a small plate with smaller servings  ? Do not eat second helpings  ? When you eat at a restaurant, ask for a box and place half of your meal in the box before you eat  ? Share an entrée with someone else  · Replace high-calorie snacks with healthy, low-calorie snacks  ? Choose fresh fruit, vegetables, fat-free rice cakes, or air-popped popcorn instead of potato chips, nuts, or chocolate  ? Choose water or calorie-free drinks instead of soda or sweetened drinks  · Do not shop for groceries when you are hungry  You may be more likely to make unhealthy food choices  Take a grocery list of healthy foods and shop after you have eaten  · Eat regular meals  Do not skip meals  Skipping meals can lead to overeating later in the day  This can make it harder for you to lose weight  Eat a healthy snack in place of a meal if you do not have time to eat a regular meal  Talk with a dietitian to help you create a meal plan and schedule that is right for you  Other things to consider as you try to lose weight:   · Be aware of situations that may give you the urge to overeat, such as eating while watching television  Find ways to avoid these situations  For example, read a book, go for a walk, or do crafts  · Meet with a weight loss support group or friends who are also trying to lose weight  This may help you stay motivated to continue working on your weight loss goals  © Copyright [employer] 2022 Information is for End User's use only and may not be sold, redistributed or otherwise used for commercial purposes   All illustrations and images included in CareNotes® are the copyrighted property of [employer] A M [employer] Inc  or Zora Aponte  The above information is an  only  It is not intended as medical advice for individual conditions or treatments  Talk to your doctor, nurse or pharmacist before following any medical regimen to see if it is safe and effective for you

## 2022-05-06 PROCEDURE — 93000 ELECTROCARDIOGRAM COMPLETE: CPT | Performed by: FAMILY MEDICINE

## 2022-11-11 DIAGNOSIS — N52.9 ERECTILE DYSFUNCTION, UNSPECIFIED ERECTILE DYSFUNCTION TYPE: ICD-10-CM

## 2022-11-11 RX ORDER — SILDENAFIL 100 MG/1
100 TABLET, FILM COATED ORAL DAILY PRN
Qty: 10 TABLET | Refills: 0 | Status: SHIPPED | OUTPATIENT
Start: 2022-11-11

## 2022-11-11 NOTE — TELEPHONE ENCOUNTER
Requested Prescriptions     Pending Prescriptions Disp Refills   • sildenafil (VIAGRA) 100 mg tablet 10 tablet 0     Sig: Take 1 tablet (100 mg total) by mouth daily as needed for erectile dysfunction     LOV 5/5/22, F/U non scheduled, Labs active

## 2025-04-07 ENCOUNTER — OFFICE VISIT (OUTPATIENT)
Dept: FAMILY MEDICINE CLINIC | Facility: CLINIC | Age: 50
End: 2025-04-07
Payer: COMMERCIAL

## 2025-04-07 VITALS
SYSTOLIC BLOOD PRESSURE: 134 MMHG | TEMPERATURE: 97 F | DIASTOLIC BLOOD PRESSURE: 90 MMHG | HEART RATE: 55 BPM | BODY MASS INDEX: 41.75 KG/M2 | RESPIRATION RATE: 16 BRPM | HEIGHT: 73 IN | OXYGEN SATURATION: 98 % | WEIGHT: 315 LBS

## 2025-04-07 DIAGNOSIS — Z23 ENCOUNTER FOR IMMUNIZATION: ICD-10-CM

## 2025-04-07 DIAGNOSIS — K91.2 POSTSURGICAL MALABSORPTION: ICD-10-CM

## 2025-04-07 DIAGNOSIS — Z12.12 SCREENING FOR COLORECTAL CANCER: ICD-10-CM

## 2025-04-07 DIAGNOSIS — Z12.11 SCREENING FOR COLORECTAL CANCER: ICD-10-CM

## 2025-04-07 DIAGNOSIS — R73.01 IMPAIRED FASTING GLUCOSE: ICD-10-CM

## 2025-04-07 DIAGNOSIS — Z00.00 WELL ADULT EXAM: ICD-10-CM

## 2025-04-07 DIAGNOSIS — Z00.00 ANNUAL PHYSICAL EXAM: Primary | ICD-10-CM

## 2025-04-07 PROCEDURE — 90715 TDAP VACCINE 7 YRS/> IM: CPT | Performed by: FAMILY MEDICINE

## 2025-04-07 PROCEDURE — 90471 IMMUNIZATION ADMIN: CPT | Performed by: FAMILY MEDICINE

## 2025-04-07 PROCEDURE — 99396 PREV VISIT EST AGE 40-64: CPT | Performed by: FAMILY MEDICINE

## 2025-04-07 NOTE — ASSESSMENT & PLAN NOTE
Orders:    Vitamin D 25 hydroxy    Vitamin B12    Iron Panel (Includes Ferritin, Iron Sat%, Iron, and TIBC); Future

## 2025-04-07 NOTE — PATIENT INSTRUCTIONS
"Await  lab, increase exercise,watch diet  Patient Education     Routine physical for adults   The Basics   Written by the doctors and editors at [facility]   What is a physical? -- A physical is a routine visit, or \"check-up,\" with your doctor. You might also hear it called a \"wellness visit\" or \"preventive visit.\"  During each visit, the doctor will:   Ask about your physical and mental health   Ask about your habits, behaviors, and lifestyle   Do an exam   Give you vaccines if needed   Talk to you about any medicines you take   Give advice about your health   Answer your questions  Getting regular check-ups is an important part of taking care of your health. It can help your doctor find and treat any problems you have. But it's also important for preventing health problems.  A routine physical is different from a \"sick visit.\" A sick visit is when you see a doctor because of a health concern or problem. Since physicals are scheduled ahead of time, you can think about what you want to ask the doctor.  How often should I get a physical? -- It depends on your age and health. In general, for people age 21 years and older:   If you are younger than 50 years, you might be able to get a physical every 3 years.   If you are 50 years or older, your doctor might recommend a physical every year.  If you have an ongoing health condition, like diabetes or high blood pressure, your doctor will probably want to see you more often.  What happens during a physical? -- In general, each visit will include:   Physical exam - The doctor or nurse will check your height, weight, heart rate, and blood pressure. They will also look at your eyes and ears. They will ask about how you are feeling and whether you have any symptoms that bother you.   Medicines - It's a good idea to bring a list of all the medicines you take to each doctor visit. Your doctor will talk to you about your medicines and answer any questions. Tell them if you are " "having any side effects that bother you. You should also tell them if you are having trouble paying for any of your medicines.   Habits and behaviors - This includes:   Your diet   Your exercise habits   Whether you smoke, drink alcohol, or use drugs   Whether you are sexually active   Whether you feel safe at home  Your doctor will talk to you about things you can do to improve your health and lower your risk of health problems. They will also offer help and support. For example, if you want to quit smoking, they can give you advice and might prescribe medicines. If you want to improve your diet or get more physical activity, they can help you with this, too.   Lab tests, if needed - The tests you get will depend on your age and situation. For example, your doctor might want to check your:   Cholesterol   Blood sugar   Iron level   Vaccines - The recommended vaccines will depend on your age, health, and what vaccines you already had. Vaccines are very important because they can prevent certain serious or deadly infections.   Discussion of screening - \"Screening\" means checking for diseases or other health problems before they cause symptoms. Your doctor can recommend screening based on your age, risk, and preferences. This might include tests to check for:   Cancer, such as breast, prostate, cervical, ovarian, colorectal, prostate, lung, or skin cancer   Sexually transmitted infections, such as chlamydia and gonorrhea   Mental health conditions like depression and anxiety  Your doctor will talk to you about the different types of screening tests. They can help you decide which screenings to have. They can also explain what the results might mean.   Answering questions - The physical is a good time to ask the doctor or nurse questions about your health. If needed, they can refer you to other doctors or specialists, too.  Adults older than 65 years often need other care, too. As you get older, your doctor will talk " to you about:   How to prevent falling at home   Hearing or vision tests   Memory testing   How to take your medicines safely   Making sure that you have the help and support you need at home  All topics are updated as new evidence becomes available and our peer review process is complete.  This topic retrieved from [employer] on: May 02, 2024.  Topic 782547 Version 1.0  Release: 32.4.3 - C32.122  © 2024 UpToDate, Inc. and/or its affiliates. All rights reserved.  Consumer Information Use and Disclaimer   Disclaimer: This generalized information is a limited summary of diagnosis, treatment, and/or medication information. It is not meant to be comprehensive and should be used as a tool to help the user understand and/or assess potential diagnostic and treatment options. It does NOT include all information about conditions, treatments, medications, side effects, or risks that may apply to a specific patient. It is not intended to be medical advice or a substitute for the medical advice, diagnosis, or treatment of a health care provider based on the health care provider's examination and assessment of a patient's specific and unique circumstances. Patients must speak with a health care provider for complete information about their health, medical questions, and treatment options, including any risks or benefits regarding use of medications. This information does not endorse any treatments or medications as safe, effective, or approved for treating a specific patient. UpToDate, Inc. and its affiliates disclaim any warranty or liability relating to this information or the use thereof.The use of this information is governed by the Terms of Use, available at https://www.wolterskluwer.com/en/know/clinical-effectiveness-terms. 2024© UpToDate, Inc. and its affiliates and/or licensors. All rights reserved.  Copyright   © 2024 UpToDate, Inc. and/or its affiliates. All rights reserved.

## 2025-04-07 NOTE — PROGRESS NOTES
Adult Annual Physical  Name: Ta Mckeon      : 1975      MRN: [id number]  Encounter Provider: Alexia Thurman MD  Encounter Date: 2025   Encounter department: [facility]    :  Assessment & Plan  Encounter for immunization    Orders:    TDAP VACCINE GREATER THAN OR EQUAL TO 6YO IM    Well adult exam    Orders:    Lipid panel    Comprehensive metabolic panel; Future    TSH, 3rd generation    CBC and differential    PSA, Total Screen; Future    UA (URINE) with reflex to Scope; Future    Impaired fasting glucose  Await lab    Orders:    Hemoglobin A1C; Future    Screening for colorectal cancer    Orders:    Cologuard    Annual physical exam         Postsurgical malabsorption    Orders:    Vitamin D 25 hydroxy    Vitamin B12    Iron Panel (Includes Ferritin, Iron Sat%, Iron, and TIBC); Future        Preventive Screenings:  - Diabetes Screening: risks/benefits discussed and orders placed  - Cholesterol Screening: orders placed and risks/benefits discussed   - Hepatitis C screening: patient declines   - HIV screening: patient declines   - Colon cancer screening: risks/benefits discussed and orders placed   - Lung cancer screening: screening not indicated   - Prostate cancer screening: risks/benefits discussed and orders placed     Immunizations:  - Immunizations due: Tdap  - Risks/benefits immunizations discussed    - Immunizations given per orders      Counseling/Anticipatory Guidance:    - Tobacco use: discussed harms of tobacco use and management options for quitting.   - Dental health: discussed importance of regular tooth brushing, flossing, and dental visits.   - Exercise: the importance of regular exercise/physical activity was discussed. Recommend exercise 3-5 times per week for at least 30 minutes.   - Injury prevention: discussed safety/seat belts, safety helmets, smoke detectors, carbon monoxide detectors, and smoking near bedding or upholstery.     Cigar  once /week       "    History of Present Illness     Adult Annual Physical:  Patient presents for annual physical. Np acute  concerns, would  like lab, is  due  for tetanus  shot  and colon cancer/prostate  cancer screening.     Diet and Physical Activity:  - Diet/Nutrition: no special diet.  - Exercise: strength training exercises.    Depression Screening:  - PHQ-2 Score: 1    General Health:  - Sleep: 4-6 hours of sleep on average. does snore  - Hearing: normal hearing right ear and normal hearing left ear.  - Vision: most recent eye exam < 1 year ago and wears glasses.  - Dental: regular dental visits.    /GYN Health:  - Follows with GYN: no.   - History of STDs: no     Health:  - History of STDs: no.   - Urinary symptoms: erectile dysfunction.     Advanced Care Planning:  - Has an advanced directive?: no    - Has a durable medical POA?: no    - ACP document given to patient?: no      Review of Systems   Constitutional:  Positive for unexpected weight change. Negative for activity change, appetite change and fatigue.        Gained  60  lb, due  for lab   HENT:  Negative for congestion, ear pain, sinus pressure and sinus pain.    Respiratory:  Negative for shortness of breath.    Cardiovascular:  Negative for chest pain.   Gastrointestinal: Negative.    Genitourinary: Negative.    Musculoskeletal:  Negative for arthralgias.   Skin:  Negative for rash.   Neurological:  Negative for dizziness, light-headedness and headaches.   Hematological:  Negative for adenopathy.   Psychiatric/Behavioral:  Negative for dysphoric mood. The patient is not nervous/anxious.          Objective   /90 (BP Location: Left arm, Patient Position: Sitting, Cuff Size: Adult)   Pulse 55   Temp (!) 97 °F (36.1 °C) (Temporal)   Resp 16   Ht 6' 1\" (1.854 m)   Wt (!) 146 kg (321 lb 3.2 oz)   SpO2 98%   BMI 42.38 kg/m²     Physical Exam  Vitals reviewed.   Constitutional:       Appearance: Normal appearance. He is obese.   HENT:      Right Ear: " Tympanic membrane normal.      Left Ear: Tympanic membrane normal.      Nose: No congestion or rhinorrhea.      Mouth/Throat:      Pharynx: No oropharyngeal exudate or posterior oropharyngeal erythema.   Cardiovascular:      Rate and Rhythm: Normal rate and regular rhythm.      Pulses: Normal pulses.      Heart sounds: Normal heart sounds.   Pulmonary:      Effort: Pulmonary effort is normal.      Breath sounds: Normal breath sounds.   Abdominal:      General: Abdomen is flat. Bowel sounds are normal.      Palpations: Abdomen is soft.   Musculoskeletal:      Right lower leg: No edema.      Left lower leg: No edema.   Lymphadenopathy:      Cervical: No cervical adenopathy.   Neurological:      Mental Status: He is alert.   Psychiatric:         Mood and Affect: Mood normal.

## 2025-04-25 LAB — COLOGUARD RESULT REPORTABLE: NEGATIVE

## 2025-04-27 ENCOUNTER — RESULTS FOLLOW-UP (OUTPATIENT)
Dept: FAMILY MEDICINE CLINIC | Facility: CLINIC | Age: 50
End: 2025-04-27

## (undated) DEVICE — INTENDED FOR TISSUE SEPARATION, AND OTHER PROCEDURES THAT REQUIRE A SHARP SURGICAL BLADE TO PUNCTURE OR CUT.: Brand: BARD-PARKER SAFETY BLADES SIZE 15, STERILE

## (undated) DEVICE — LAPAROSCOPIC TROCAR SLEEVE/SINGLE USE: Brand: KII® SLEEVE

## (undated) DEVICE — NEEDLE SPINAL18G X 3.5 IN QUINCKE

## (undated) DEVICE — TIBURON LAPAROSCOPIC ABDOMINAL DRAPE: Brand: CONVERTORS

## (undated) DEVICE — SCD SEQUENTIAL COMPRESSION COMFORT SLEEVE MEDIUM KNEE LENGTH: Brand: KENDALL SCD

## (undated) DEVICE — HARMONIC 1100 SHEARS, 36CM SHAFT LENGTH: Brand: HARMONIC

## (undated) DEVICE — VIOLET BRAIDED (POLYGLACTIN 910), SYNTHETIC ABSORBABLE SUTURE: Brand: COATED VICRYL

## (undated) DEVICE — TUBING SMOKE EVAC W/FILTRATION DEVICE PLUMEPORT ACTIV

## (undated) DEVICE — Device

## (undated) DEVICE — TROCAR: Brand: KII FIOS FIRST ENTRY

## (undated) DEVICE — ADHESIVE SKIN CLSR DERMABOND NX

## (undated) DEVICE — [HIGH FLOW INSUFFLATOR,  DO NOT USE IF PACKAGE IS DAMAGED,  KEEP DRY,  KEEP AWAY FROM SUNLIGHT,  PROTECT FROM HEAT AND RADIOACTIVE SOURCES.]: Brand: PNEUMOSURE

## (undated) DEVICE — IRRIG ENDO FLO TUBING

## (undated) DEVICE — ENDO STITCH 2-0 VICRYL

## (undated) DEVICE — TUBING SUCTION 5MM X 12 FT

## (undated) DEVICE — 10 MM BABCOCKS WITH RATCHET HANDLES: Brand: ENDOPATH

## (undated) DEVICE — SYRINGE 30ML LL

## (undated) DEVICE — 3000CC GUARDIAN II: Brand: GUARDIAN

## (undated) DEVICE — ENDO STITCH DEVICE 10 MM

## (undated) DEVICE — POWER SHELL SIGNIA

## (undated) DEVICE — GLOVE INDICATOR PI UNDERGLOVE SZ 7 BLUE

## (undated) DEVICE — DRAPE EQUIPMENT RF WAND

## (undated) DEVICE — BAG DECANTER

## (undated) DEVICE — ALLENTOWN LAP CHOLE APP PACK: Brand: CARDINAL HEALTH

## (undated) DEVICE — PENCIL ELECTROSURG E-Z CLEAN -0035H

## (undated) DEVICE — VISUALIZATION SYSTEM: Brand: CLEARIFY

## (undated) DEVICE — ENDO STITCH 0 SURGIDAC 48 IN

## (undated) DEVICE — ENDOPOUCH RETRIEVER SPECIMEN RETRIEVAL BAGS: Brand: ENDOPOUCH RETRIEVER

## (undated) DEVICE — COVIDIEN ENDO GIA PURPLE (MED) RELOAD 60MM

## (undated) DEVICE — GLOVE SRG BIOGEL 8

## (undated) DEVICE — GLOVE PI ULTRA TOUCH SZ.7.0

## (undated) DEVICE — TROCAR VISIPORT

## (undated) DEVICE — ENDOPATH 5MM CURVED SCISSORS WITH MONOPOLAR CAUTERY: Brand: ENDOPATH

## (undated) DEVICE — SUT MONOCRYL 4-0 PS-2 27 IN Y426H

## (undated) DEVICE — TRAVELKIT CONTAINS FIRST STEP KIT (200ML EP-4 KIT) AND SOILED SCOPE BAG - 1 KIT: Brand: TRAVELKIT CONTAINS FIRST STEP KIT AND SOILED SCOPE BAG

## (undated) DEVICE — STAPLER EEA 25 MM COVIDIEN

## (undated) DEVICE — WEBRIL 6 IN UNSTERILE

## (undated) DEVICE — ELECTRODE LAP SPATULA STR E-Z CLEAN 33CM -0018

## (undated) DEVICE — TROCARS: Brand: KII® BALLOON BLUNT TIP SYSTEM

## (undated) DEVICE — SYRINGE 20ML LL

## (undated) DEVICE — ASTOUND STANDARD SURGICAL GOWN, XL: Brand: CONVERTORS

## (undated) DEVICE — STAPLER ENDO GIA ROTICULATOR 60-2.5

## (undated) DEVICE — 2000CC GUARDIAN II: Brand: GUARDIAN

## (undated) DEVICE — CHLORAPREP HI-LITE 26ML ORANGE

## (undated) DEVICE — SINGLE-USE BIOPSY FORCEPS: Brand: RADIAL JAW 4

## (undated) DEVICE — PMI DISPOSABLE PUNCTURE CLOSURE DEVICE / SUTURE GRASPER: Brand: PMI

## (undated) DEVICE — URETERAL CATHETER ADAPTOR TIP

## (undated) DEVICE — INTRODUCER ANAL ABDOM EEA25 DISP STRL